# Patient Record
Sex: FEMALE | Race: WHITE | Employment: OTHER | URBAN - METROPOLITAN AREA
[De-identification: names, ages, dates, MRNs, and addresses within clinical notes are randomized per-mention and may not be internally consistent; named-entity substitution may affect disease eponyms.]

---

## 2017-05-31 ENCOUNTER — HOSPITAL ENCOUNTER (INPATIENT)
Age: 77
LOS: 5 days | Discharge: NURSING, REHAB OR LONG-TERM CARE | DRG: 470 | End: 2017-06-05
Attending: EMERGENCY MEDICINE | Admitting: FAMILY MEDICINE
Payer: MEDICARE

## 2017-05-31 ENCOUNTER — ANESTHESIA EVENT (OUTPATIENT)
Dept: SURGERY | Age: 77
DRG: 470 | End: 2017-05-31
Payer: MEDICARE

## 2017-05-31 ENCOUNTER — APPOINTMENT (OUTPATIENT)
Dept: GENERAL RADIOLOGY | Age: 77
DRG: 470 | End: 2017-05-31
Attending: EMERGENCY MEDICINE
Payer: MEDICARE

## 2017-05-31 ENCOUNTER — ANESTHESIA (OUTPATIENT)
Dept: SURGERY | Age: 77
DRG: 470 | End: 2017-05-31
Payer: MEDICARE

## 2017-05-31 ENCOUNTER — APPOINTMENT (OUTPATIENT)
Dept: GENERAL RADIOLOGY | Age: 77
DRG: 470 | End: 2017-05-31
Attending: ORTHOPAEDIC SURGERY
Payer: MEDICARE

## 2017-05-31 DIAGNOSIS — S72.001A HIP FRACTURE, RIGHT, CLOSED, INITIAL ENCOUNTER (HCC): Primary | ICD-10-CM

## 2017-05-31 PROBLEM — S72.009A HIP FRACTURE (HCC): Status: ACTIVE | Noted: 2017-05-31

## 2017-05-31 LAB
ABO + RH BLD: NORMAL
ALBUMIN SERPL BCP-MCNC: 3.6 G/DL (ref 3.5–5)
ALBUMIN/GLOB SERPL: 0.9 {RATIO} (ref 1.1–2.2)
ALP SERPL-CCNC: 125 U/L (ref 45–117)
ALT SERPL-CCNC: 17 U/L (ref 12–78)
ANION GAP BLD CALC-SCNC: 8 MMOL/L (ref 5–15)
AST SERPL W P-5'-P-CCNC: 13 U/L (ref 15–37)
ATRIAL RATE: 62 BPM
BASOPHILS # BLD AUTO: 0.1 K/UL (ref 0–0.1)
BASOPHILS # BLD: 2 % (ref 0–1)
BILIRUB SERPL-MCNC: 0.5 MG/DL (ref 0.2–1)
BLOOD GROUP ANTIBODIES SERPL: NORMAL
BUN SERPL-MCNC: 15 MG/DL (ref 6–20)
BUN/CREAT SERPL: 19 (ref 12–20)
CALCIUM SERPL-MCNC: 9.3 MG/DL (ref 8.5–10.1)
CALCULATED P AXIS, ECG09: 72 DEGREES
CALCULATED R AXIS, ECG10: -6 DEGREES
CALCULATED T AXIS, ECG11: 37 DEGREES
CHLORIDE SERPL-SCNC: 105 MMOL/L (ref 97–108)
CO2 SERPL-SCNC: 29 MMOL/L (ref 21–32)
CREAT SERPL-MCNC: 0.81 MG/DL (ref 0.55–1.02)
DIAGNOSIS, 93000: NORMAL
EOSINOPHIL # BLD: 0 K/UL (ref 0–0.4)
EOSINOPHIL NFR BLD: 1 % (ref 0–7)
ERYTHROCYTE [DISTWIDTH] IN BLOOD BY AUTOMATED COUNT: 15.1 % (ref 11.5–14.5)
GLOBULIN SER CALC-MCNC: 4 G/DL (ref 2–4)
GLUCOSE SERPL-MCNC: 88 MG/DL (ref 65–100)
HCT VFR BLD AUTO: 36.3 % (ref 35–47)
HGB BLD-MCNC: 12 G/DL (ref 11.5–16)
INR PPP: 1.1 (ref 0.9–1.1)
LYMPHOCYTES # BLD AUTO: 31 % (ref 12–49)
LYMPHOCYTES # BLD: 1.2 K/UL (ref 0.8–3.5)
MAGNESIUM SERPL-MCNC: 2.3 MG/DL (ref 1.6–2.4)
MCH RBC QN AUTO: 29.3 PG (ref 26–34)
MCHC RBC AUTO-ENTMCNC: 33.1 G/DL (ref 30–36.5)
MCV RBC AUTO: 88.5 FL (ref 80–99)
MONOCYTES # BLD: 0.4 K/UL (ref 0–1)
MONOCYTES NFR BLD AUTO: 10 % (ref 5–13)
NEUTS SEG # BLD: 2.3 K/UL (ref 1.8–8)
NEUTS SEG NFR BLD AUTO: 56 % (ref 32–75)
P-R INTERVAL, ECG05: 166 MS
PLATELET # BLD AUTO: 242 K/UL (ref 150–400)
POTASSIUM SERPL-SCNC: 3.5 MMOL/L (ref 3.5–5.1)
PROT SERPL-MCNC: 7.6 G/DL (ref 6.4–8.2)
PROTHROMBIN TIME: 11.5 SEC (ref 9–11.1)
Q-T INTERVAL, ECG07: 472 MS
QRS DURATION, ECG06: 92 MS
QTC CALCULATION (BEZET), ECG08: 479 MS
RBC # BLD AUTO: 4.1 M/UL (ref 3.8–5.2)
SODIUM SERPL-SCNC: 142 MMOL/L (ref 136–145)
SPECIMEN EXP DATE BLD: NORMAL
VENTRICULAR RATE, ECG03: 62 BPM
WBC # BLD AUTO: 4 K/UL (ref 3.6–11)

## 2017-05-31 PROCEDURE — 74011000250 HC RX REV CODE- 250: Performed by: PHYSICIAN ASSISTANT

## 2017-05-31 PROCEDURE — 74011250636 HC RX REV CODE- 250/636

## 2017-05-31 PROCEDURE — 74011250636 HC RX REV CODE- 250/636: Performed by: PHYSICIAN ASSISTANT

## 2017-05-31 PROCEDURE — 77030032490 HC SLV COMPR SCD KNE COVD -B: Performed by: ORTHOPAEDIC SURGERY

## 2017-05-31 PROCEDURE — 99284 EMERGENCY DEPT VISIT MOD MDM: CPT

## 2017-05-31 PROCEDURE — C1776 JOINT DEVICE (IMPLANTABLE): HCPCS | Performed by: ORTHOPAEDIC SURGERY

## 2017-05-31 PROCEDURE — 77030018836 HC SOL IRR NACL ICUM -A: Performed by: ORTHOPAEDIC SURGERY

## 2017-05-31 PROCEDURE — 74011250637 HC RX REV CODE- 250/637: Performed by: PHYSICIAN ASSISTANT

## 2017-05-31 PROCEDURE — 77030020788: Performed by: ORTHOPAEDIC SURGERY

## 2017-05-31 PROCEDURE — 74011000258 HC RX REV CODE- 258: Performed by: PHYSICIAN ASSISTANT

## 2017-05-31 PROCEDURE — 77030012935 HC DRSG AQUACEL BMS -B: Performed by: ORTHOPAEDIC SURGERY

## 2017-05-31 PROCEDURE — 76000 FLUOROSCOPY <1 HR PHYS/QHP: CPT

## 2017-05-31 PROCEDURE — 77030034850: Performed by: ORTHOPAEDIC SURGERY

## 2017-05-31 PROCEDURE — 77030006784 HC BLD SAW OSC MCRA -B: Performed by: ORTHOPAEDIC SURGERY

## 2017-05-31 PROCEDURE — 77030008684 HC TU ET CUF COVD -B: Performed by: ANESTHESIOLOGY

## 2017-05-31 PROCEDURE — 36415 COLL VENOUS BLD VENIPUNCTURE: CPT | Performed by: EMERGENCY MEDICINE

## 2017-05-31 PROCEDURE — 77030002933 HC SUT MCRYL J&J -A: Performed by: ORTHOPAEDIC SURGERY

## 2017-05-31 PROCEDURE — 77030031139 HC SUT VCRL2 J&J -A: Performed by: ORTHOPAEDIC SURGERY

## 2017-05-31 PROCEDURE — 76060000034 HC ANESTHESIA 1.5 TO 2 HR: Performed by: ORTHOPAEDIC SURGERY

## 2017-05-31 PROCEDURE — 74011000250 HC RX REV CODE- 250: Performed by: ORTHOPAEDIC SURGERY

## 2017-05-31 PROCEDURE — 77030011640 HC PAD GRND REM COVD -A: Performed by: ORTHOPAEDIC SURGERY

## 2017-05-31 PROCEDURE — 0SRR01Z REPLACEMENT OF RIGHT HIP JOINT, FEMORAL SURFACE WITH METAL SYNTHETIC SUBSTITUTE, OPEN APPROACH: ICD-10-PCS | Performed by: ORTHOPAEDIC SURGERY

## 2017-05-31 PROCEDURE — 74011000250 HC RX REV CODE- 250

## 2017-05-31 PROCEDURE — 77030018846 HC SOL IRR STRL H20 ICUM -A: Performed by: ORTHOPAEDIC SURGERY

## 2017-05-31 PROCEDURE — 77030026438 HC STYL ET INTUB CARD -A: Performed by: ANESTHESIOLOGY

## 2017-05-31 PROCEDURE — 65660000000 HC RM CCU STEPDOWN

## 2017-05-31 PROCEDURE — 74011250636 HC RX REV CODE- 250/636: Performed by: ANESTHESIOLOGY

## 2017-05-31 PROCEDURE — 77030010507 HC ADH SKN DERMBND J&J -B: Performed by: ORTHOPAEDIC SURGERY

## 2017-05-31 PROCEDURE — 76210000016 HC OR PH I REC 1 TO 1.5 HR: Performed by: ORTHOPAEDIC SURGERY

## 2017-05-31 PROCEDURE — 85025 COMPLETE CBC W/AUTO DIFF WBC: CPT | Performed by: EMERGENCY MEDICINE

## 2017-05-31 PROCEDURE — 93005 ELECTROCARDIOGRAM TRACING: CPT

## 2017-05-31 PROCEDURE — 80053 COMPREHEN METABOLIC PANEL: CPT | Performed by: EMERGENCY MEDICINE

## 2017-05-31 PROCEDURE — 73501 X-RAY EXAM HIP UNI 1 VIEW: CPT

## 2017-05-31 PROCEDURE — 86900 BLOOD TYPING SEROLOGIC ABO: CPT | Performed by: FAMILY MEDICINE

## 2017-05-31 PROCEDURE — 76010000153 HC OR TIME 1.5 TO 2 HR: Performed by: ORTHOPAEDIC SURGERY

## 2017-05-31 PROCEDURE — 83735 ASSAY OF MAGNESIUM: CPT | Performed by: FAMILY MEDICINE

## 2017-05-31 PROCEDURE — 77030035236 HC SUT PDS STRATFX BARB J&J -B: Performed by: ORTHOPAEDIC SURGERY

## 2017-05-31 PROCEDURE — 51798 US URINE CAPACITY MEASURE: CPT

## 2017-05-31 PROCEDURE — 77030013079 HC BLNKT BAIR HGGR 3M -A: Performed by: ANESTHESIOLOGY

## 2017-05-31 PROCEDURE — 71010 XR CHEST PORT: CPT

## 2017-05-31 PROCEDURE — 85610 PROTHROMBIN TIME: CPT | Performed by: EMERGENCY MEDICINE

## 2017-05-31 DEVICE — STEM FEM CEM HIP UPLR PART POROUS HA SUMMIT: Type: IMPLANTABLE DEVICE | Status: FUNCTIONAL

## 2017-05-31 DEVICE — HEAD BPLR OD50MM ID28MM FEM HIP SELF CNTR: Type: IMPLANTABLE DEVICE | Site: HIP | Status: FUNCTIONAL

## 2017-05-31 DEVICE — HEAD FEM DIA28MM NK L+1.5MM HIP MTL ON MTL 12/14 TAPR: Type: IMPLANTABLE DEVICE | Site: HIP | Status: FUNCTIONAL

## 2017-05-31 DEVICE — STEM FEM SZ 6 L150MM 130DEG STD OFFSET CALCAR HIP BILAT TI: Type: IMPLANTABLE DEVICE | Site: HIP | Status: FUNCTIONAL

## 2017-05-31 DEVICE — STEM FEM CEM HIP UPLR HD PART POROUS HA SUMMIT: Type: IMPLANTABLE DEVICE | Status: FUNCTIONAL

## 2017-05-31 RX ORDER — ONDANSETRON 2 MG/ML
4 INJECTION INTRAMUSCULAR; INTRAVENOUS AS NEEDED
Status: DISCONTINUED | OUTPATIENT
Start: 2017-05-31 | End: 2017-05-31 | Stop reason: HOSPADM

## 2017-05-31 RX ORDER — CEFAZOLIN SODIUM IN 0.9 % NACL 2 G/50 ML
2 INTRAVENOUS SOLUTION, PIGGYBACK (ML) INTRAVENOUS ONCE
Status: DISCONTINUED | OUTPATIENT
Start: 2017-05-31 | End: 2017-05-31 | Stop reason: SDUPTHER

## 2017-05-31 RX ORDER — LIDOCAINE HYDROCHLORIDE 10 MG/ML
0.1 INJECTION, SOLUTION EPIDURAL; INFILTRATION; INTRACAUDAL; PERINEURAL AS NEEDED
Status: DISCONTINUED | OUTPATIENT
Start: 2017-05-31 | End: 2017-05-31 | Stop reason: HOSPADM

## 2017-05-31 RX ORDER — MIDAZOLAM HYDROCHLORIDE 1 MG/ML
0.5 INJECTION, SOLUTION INTRAMUSCULAR; INTRAVENOUS
Status: DISCONTINUED | OUTPATIENT
Start: 2017-05-31 | End: 2017-05-31 | Stop reason: HOSPADM

## 2017-05-31 RX ORDER — PROPOFOL 10 MG/ML
INJECTION, EMULSION INTRAVENOUS AS NEEDED
Status: DISCONTINUED | OUTPATIENT
Start: 2017-05-31 | End: 2017-05-31 | Stop reason: HOSPADM

## 2017-05-31 RX ORDER — ACETAMINOPHEN 500 MG
1000 TABLET ORAL ONCE
Status: COMPLETED | OUTPATIENT
Start: 2017-05-31 | End: 2017-05-31

## 2017-05-31 RX ORDER — PRIMIDONE 50 MG/1
200 TABLET ORAL
Status: DISCONTINUED | OUTPATIENT
Start: 2017-06-01 | End: 2017-06-05 | Stop reason: HOSPADM

## 2017-05-31 RX ORDER — OXYCODONE HYDROCHLORIDE 5 MG/1
2.5 TABLET ORAL
Status: DISCONTINUED | OUTPATIENT
Start: 2017-05-31 | End: 2017-06-05 | Stop reason: HOSPADM

## 2017-05-31 RX ORDER — SODIUM CHLORIDE 0.9 % (FLUSH) 0.9 %
5-10 SYRINGE (ML) INJECTION AS NEEDED
Status: DISCONTINUED | OUTPATIENT
Start: 2017-05-31 | End: 2017-05-31 | Stop reason: HOSPADM

## 2017-05-31 RX ORDER — NEOSTIGMINE METHYLSULFATE 1 MG/ML
INJECTION INTRAVENOUS AS NEEDED
Status: DISCONTINUED | OUTPATIENT
Start: 2017-05-31 | End: 2017-05-31 | Stop reason: HOSPADM

## 2017-05-31 RX ORDER — ESCITALOPRAM OXALATE 10 MG/1
20 TABLET ORAL DAILY
Status: DISCONTINUED | OUTPATIENT
Start: 2017-06-01 | End: 2017-06-05 | Stop reason: HOSPADM

## 2017-05-31 RX ORDER — FACIAL-BODY WIPES
10 EACH TOPICAL DAILY PRN
Status: DISCONTINUED | OUTPATIENT
Start: 2017-06-02 | End: 2017-06-05 | Stop reason: HOSPADM

## 2017-05-31 RX ORDER — TRAMADOL HYDROCHLORIDE 50 MG/1
50 TABLET ORAL
Status: DISCONTINUED | OUTPATIENT
Start: 2017-05-31 | End: 2017-06-05 | Stop reason: HOSPADM

## 2017-05-31 RX ORDER — PREGABALIN 75 MG/1
75 CAPSULE ORAL ONCE
Status: COMPLETED | OUTPATIENT
Start: 2017-05-31 | End: 2017-05-31

## 2017-05-31 RX ORDER — HYDROCODONE BITARTRATE AND ACETAMINOPHEN 5; 325 MG/1; MG/1
1 TABLET ORAL
COMMUNITY

## 2017-05-31 RX ORDER — LIDOCAINE HYDROCHLORIDE 20 MG/ML
INJECTION, SOLUTION EPIDURAL; INFILTRATION; INTRACAUDAL; PERINEURAL AS NEEDED
Status: DISCONTINUED | OUTPATIENT
Start: 2017-05-31 | End: 2017-05-31 | Stop reason: HOSPADM

## 2017-05-31 RX ORDER — ROCURONIUM BROMIDE 10 MG/ML
INJECTION, SOLUTION INTRAVENOUS AS NEEDED
Status: DISCONTINUED | OUTPATIENT
Start: 2017-05-31 | End: 2017-05-31 | Stop reason: HOSPADM

## 2017-05-31 RX ORDER — NALOXONE HYDROCHLORIDE 0.4 MG/ML
0.4 INJECTION, SOLUTION INTRAMUSCULAR; INTRAVENOUS; SUBCUTANEOUS AS NEEDED
Status: DISCONTINUED | OUTPATIENT
Start: 2017-05-31 | End: 2017-06-05 | Stop reason: HOSPADM

## 2017-05-31 RX ORDER — MIDAZOLAM HYDROCHLORIDE 1 MG/ML
1 INJECTION, SOLUTION INTRAMUSCULAR; INTRAVENOUS AS NEEDED
Status: DISCONTINUED | OUTPATIENT
Start: 2017-05-31 | End: 2017-05-31 | Stop reason: HOSPADM

## 2017-05-31 RX ORDER — AMOXICILLIN 250 MG
1 CAPSULE ORAL 2 TIMES DAILY
Status: DISCONTINUED | OUTPATIENT
Start: 2017-06-01 | End: 2017-06-05 | Stop reason: HOSPADM

## 2017-05-31 RX ORDER — GLYCOPYRROLATE 0.2 MG/ML
INJECTION INTRAMUSCULAR; INTRAVENOUS AS NEEDED
Status: DISCONTINUED | OUTPATIENT
Start: 2017-05-31 | End: 2017-05-31 | Stop reason: HOSPADM

## 2017-05-31 RX ORDER — MIDODRINE HYDROCHLORIDE 10 MG/1
10 TABLET ORAL 3 TIMES DAILY
COMMUNITY

## 2017-05-31 RX ORDER — OXYCODONE AND ACETAMINOPHEN 5; 325 MG/1; MG/1
1 TABLET ORAL AS NEEDED
Status: DISCONTINUED | OUTPATIENT
Start: 2017-05-31 | End: 2017-05-31 | Stop reason: HOSPADM

## 2017-05-31 RX ORDER — LORATADINE 10 MG/1
10 TABLET ORAL
COMMUNITY

## 2017-05-31 RX ORDER — CLONAZEPAM 1 MG/1
1 TABLET ORAL 2 TIMES DAILY
COMMUNITY

## 2017-05-31 RX ORDER — HYDROMORPHONE HYDROCHLORIDE 1 MG/ML
0.2 INJECTION, SOLUTION INTRAMUSCULAR; INTRAVENOUS; SUBCUTANEOUS
Status: DISCONTINUED | OUTPATIENT
Start: 2017-05-31 | End: 2017-05-31 | Stop reason: HOSPADM

## 2017-05-31 RX ORDER — BUTALBITAL, ACETAMINOPHEN, CAFFEINE AND CODEINE PHOSPHATE 50; 325; 40; 30 MG/1; MG/1; MG/1; MG/1
1 CAPSULE ORAL
COMMUNITY

## 2017-05-31 RX ORDER — ACETAMINOPHEN 325 MG/1
650 TABLET ORAL EVERY 6 HOURS
Status: DISCONTINUED | OUTPATIENT
Start: 2017-06-01 | End: 2017-06-05 | Stop reason: HOSPADM

## 2017-05-31 RX ORDER — CEFAZOLIN SODIUM IN 0.9 % NACL 2 G/50 ML
2 INTRAVENOUS SOLUTION, PIGGYBACK (ML) INTRAVENOUS EVERY 8 HOURS
Status: COMPLETED | OUTPATIENT
Start: 2017-06-01 | End: 2017-06-01

## 2017-05-31 RX ORDER — OXYCODONE HYDROCHLORIDE 5 MG/1
5 TABLET ORAL
Status: DISCONTINUED | OUTPATIENT
Start: 2017-05-31 | End: 2017-06-05 | Stop reason: HOSPADM

## 2017-05-31 RX ORDER — MIDODRINE HYDROCHLORIDE 5 MG/1
10 TABLET ORAL
Status: DISCONTINUED | OUTPATIENT
Start: 2017-06-01 | End: 2017-06-05 | Stop reason: HOSPADM

## 2017-05-31 RX ORDER — ONDANSETRON 2 MG/ML
4 INJECTION INTRAMUSCULAR; INTRAVENOUS
Status: ACTIVE | OUTPATIENT
Start: 2017-05-31 | End: 2017-06-01

## 2017-05-31 RX ORDER — MELOXICAM 7.5 MG/1
15 TABLET ORAL DAILY
Status: DISCONTINUED | OUTPATIENT
Start: 2017-06-01 | End: 2017-06-05 | Stop reason: HOSPADM

## 2017-05-31 RX ORDER — ONDANSETRON 2 MG/ML
INJECTION INTRAMUSCULAR; INTRAVENOUS AS NEEDED
Status: DISCONTINUED | OUTPATIENT
Start: 2017-05-31 | End: 2017-05-31 | Stop reason: HOSPADM

## 2017-05-31 RX ORDER — HYDROXYZINE HYDROCHLORIDE 10 MG/1
10 TABLET, FILM COATED ORAL
Status: DISCONTINUED | OUTPATIENT
Start: 2017-05-31 | End: 2017-06-05 | Stop reason: HOSPADM

## 2017-05-31 RX ORDER — SODIUM CHLORIDE 0.9 % (FLUSH) 0.9 %
5-10 SYRINGE (ML) INJECTION EVERY 8 HOURS
Status: DISCONTINUED | OUTPATIENT
Start: 2017-06-01 | End: 2017-06-05 | Stop reason: HOSPADM

## 2017-05-31 RX ORDER — SODIUM CHLORIDE, SODIUM LACTATE, POTASSIUM CHLORIDE, CALCIUM CHLORIDE 600; 310; 30; 20 MG/100ML; MG/100ML; MG/100ML; MG/100ML
125 INJECTION, SOLUTION INTRAVENOUS CONTINUOUS
Status: DISCONTINUED | OUTPATIENT
Start: 2017-05-31 | End: 2017-05-31 | Stop reason: HOSPADM

## 2017-05-31 RX ORDER — THERA TABS 400 MCG
1 TAB ORAL DAILY
Status: DISCONTINUED | OUTPATIENT
Start: 2017-06-01 | End: 2017-06-05 | Stop reason: HOSPADM

## 2017-05-31 RX ORDER — SODIUM CHLORIDE 0.9 % (FLUSH) 0.9 %
5-10 SYRINGE (ML) INJECTION AS NEEDED
Status: DISCONTINUED | OUTPATIENT
Start: 2017-05-31 | End: 2017-06-05 | Stop reason: HOSPADM

## 2017-05-31 RX ORDER — POLYETHYLENE GLYCOL 3350 17 G/17G
17 POWDER, FOR SOLUTION ORAL DAILY
Status: DISCONTINUED | OUTPATIENT
Start: 2017-06-01 | End: 2017-06-05 | Stop reason: HOSPADM

## 2017-05-31 RX ORDER — SODIUM CHLORIDE 9 MG/ML
75 INJECTION, SOLUTION INTRAVENOUS CONTINUOUS
Status: DISCONTINUED | OUTPATIENT
Start: 2017-06-01 | End: 2017-06-01

## 2017-05-31 RX ORDER — ENOXAPARIN SODIUM 100 MG/ML
40 INJECTION SUBCUTANEOUS DAILY
Status: DISCONTINUED | OUTPATIENT
Start: 2017-06-01 | End: 2017-06-05 | Stop reason: HOSPADM

## 2017-05-31 RX ORDER — FENTANYL CITRATE 50 UG/ML
INJECTION, SOLUTION INTRAMUSCULAR; INTRAVENOUS AS NEEDED
Status: DISCONTINUED | OUTPATIENT
Start: 2017-05-31 | End: 2017-05-31 | Stop reason: HOSPADM

## 2017-05-31 RX ORDER — DEXAMETHASONE SODIUM PHOSPHATE 4 MG/ML
4 INJECTION, SOLUTION INTRA-ARTICULAR; INTRALESIONAL; INTRAMUSCULAR; INTRAVENOUS; SOFT TISSUE ONCE
Status: DISCONTINUED | OUTPATIENT
Start: 2017-05-31 | End: 2017-05-31 | Stop reason: HOSPADM

## 2017-05-31 RX ORDER — FLUDROCORTISONE ACETATE 0.1 MG/1
0.1 TABLET ORAL DAILY
Status: DISCONTINUED | OUTPATIENT
Start: 2017-06-01 | End: 2017-06-05 | Stop reason: HOSPADM

## 2017-05-31 RX ORDER — FLUDROCORTISONE ACETATE 0.1 MG/1
0.1 TABLET ORAL DAILY
COMMUNITY

## 2017-05-31 RX ORDER — FERROUS SULFATE, DRIED 160(50) MG
1 TABLET, EXTENDED RELEASE ORAL
Status: DISCONTINUED | OUTPATIENT
Start: 2017-06-01 | End: 2017-06-05 | Stop reason: HOSPADM

## 2017-05-31 RX ORDER — CELECOXIB 200 MG/1
200 CAPSULE ORAL ONCE
Status: COMPLETED | OUTPATIENT
Start: 2017-05-31 | End: 2017-05-31

## 2017-05-31 RX ORDER — DIPHENHYDRAMINE HYDROCHLORIDE 50 MG/ML
12.5 INJECTION, SOLUTION INTRAMUSCULAR; INTRAVENOUS AS NEEDED
Status: DISCONTINUED | OUTPATIENT
Start: 2017-05-31 | End: 2017-05-31 | Stop reason: HOSPADM

## 2017-05-31 RX ORDER — PRIMIDONE 50 MG/1
200 TABLET ORAL
COMMUNITY

## 2017-05-31 RX ORDER — CEFAZOLIN SODIUM IN 0.9 % NACL 2 G/50 ML
2 INTRAVENOUS SOLUTION, PIGGYBACK (ML) INTRAVENOUS ONCE
Status: COMPLETED | OUTPATIENT
Start: 2017-05-31 | End: 2017-05-31

## 2017-05-31 RX ORDER — CLONAZEPAM 1 MG/1
1 TABLET ORAL
Status: DISCONTINUED | OUTPATIENT
Start: 2017-05-31 | End: 2017-06-05 | Stop reason: HOSPADM

## 2017-05-31 RX ORDER — BUPIVACAINE HYDROCHLORIDE AND EPINEPHRINE 5; 5 MG/ML; UG/ML
30 INJECTION, SOLUTION EPIDURAL; INTRACAUDAL; PERINEURAL ONCE
Status: COMPLETED | OUTPATIENT
Start: 2017-05-31 | End: 2017-05-31

## 2017-05-31 RX ORDER — FENTANYL CITRATE 50 UG/ML
50 INJECTION, SOLUTION INTRAMUSCULAR; INTRAVENOUS AS NEEDED
Status: DISCONTINUED | OUTPATIENT
Start: 2017-05-31 | End: 2017-05-31 | Stop reason: HOSPADM

## 2017-05-31 RX ORDER — MORPHINE SULFATE 10 MG/ML
2 INJECTION, SOLUTION INTRAMUSCULAR; INTRAVENOUS
Status: DISCONTINUED | OUTPATIENT
Start: 2017-05-31 | End: 2017-05-31 | Stop reason: HOSPADM

## 2017-05-31 RX ORDER — ESCITALOPRAM OXALATE 20 MG/1
20 TABLET ORAL DAILY
COMMUNITY

## 2017-05-31 RX ORDER — SODIUM CHLORIDE 9 MG/ML
125 INJECTION, SOLUTION INTRAVENOUS CONTINUOUS
Status: DISCONTINUED | OUTPATIENT
Start: 2017-05-31 | End: 2017-06-01

## 2017-05-31 RX ORDER — SODIUM CHLORIDE 9 MG/ML
25 INJECTION, SOLUTION INTRAVENOUS CONTINUOUS
Status: DISCONTINUED | OUTPATIENT
Start: 2017-05-31 | End: 2017-05-31 | Stop reason: HOSPADM

## 2017-05-31 RX ORDER — FENTANYL CITRATE 50 UG/ML
25 INJECTION, SOLUTION INTRAMUSCULAR; INTRAVENOUS
Status: COMPLETED | OUTPATIENT
Start: 2017-05-31 | End: 2017-05-31

## 2017-05-31 RX ORDER — MORPHINE SULFATE 2 MG/ML
1 INJECTION, SOLUTION INTRAMUSCULAR; INTRAVENOUS
Status: ACTIVE | OUTPATIENT
Start: 2017-05-31 | End: 2017-06-01

## 2017-05-31 RX ORDER — HYDROMORPHONE HYDROCHLORIDE 1 MG/ML
INJECTION, SOLUTION INTRAMUSCULAR; INTRAVENOUS; SUBCUTANEOUS AS NEEDED
Status: DISCONTINUED | OUTPATIENT
Start: 2017-05-31 | End: 2017-05-31 | Stop reason: HOSPADM

## 2017-05-31 RX ORDER — ONDANSETRON 4 MG/1
4 TABLET, ORALLY DISINTEGRATING ORAL
Status: DISCONTINUED | OUTPATIENT
Start: 2017-05-31 | End: 2017-06-05 | Stop reason: HOSPADM

## 2017-05-31 RX ORDER — DEXAMETHASONE SODIUM PHOSPHATE 4 MG/ML
INJECTION, SOLUTION INTRA-ARTICULAR; INTRALESIONAL; INTRAMUSCULAR; INTRAVENOUS; SOFT TISSUE AS NEEDED
Status: DISCONTINUED | OUTPATIENT
Start: 2017-05-31 | End: 2017-05-31 | Stop reason: HOSPADM

## 2017-05-31 RX ORDER — HYDROCODONE BITARTRATE AND ACETAMINOPHEN 5; 325 MG/1; MG/1
1 TABLET ORAL
Status: CANCELLED | OUTPATIENT
Start: 2017-05-31

## 2017-05-31 RX ORDER — SODIUM CHLORIDE 0.9 % (FLUSH) 0.9 %
5-10 SYRINGE (ML) INJECTION EVERY 8 HOURS
Status: DISCONTINUED | OUTPATIENT
Start: 2017-05-31 | End: 2017-05-31 | Stop reason: HOSPADM

## 2017-05-31 RX ADMIN — FENTANYL CITRATE 25 MCG: 50 INJECTION, SOLUTION INTRAMUSCULAR; INTRAVENOUS at 20:09

## 2017-05-31 RX ADMIN — LIDOCAINE HYDROCHLORIDE 60 MG: 20 INJECTION, SOLUTION EPIDURAL; INFILTRATION; INTRACAUDAL; PERINEURAL at 19:24

## 2017-05-31 RX ADMIN — HYDROMORPHONE HYDROCHLORIDE 0.25 MG: 1 INJECTION, SOLUTION INTRAMUSCULAR; INTRAVENOUS; SUBCUTANEOUS at 21:22

## 2017-05-31 RX ADMIN — FENTANYL CITRATE 25 MCG: 50 INJECTION, SOLUTION INTRAMUSCULAR; INTRAVENOUS at 21:25

## 2017-05-31 RX ADMIN — DEXAMETHASONE SODIUM PHOSPHATE 8 MG: 4 INJECTION, SOLUTION INTRA-ARTICULAR; INTRALESIONAL; INTRAMUSCULAR; INTRAVENOUS; SOFT TISSUE at 19:55

## 2017-05-31 RX ADMIN — FENTANYL CITRATE 25 MCG: 50 INJECTION, SOLUTION INTRAMUSCULAR; INTRAVENOUS at 21:35

## 2017-05-31 RX ADMIN — ROCURONIUM BROMIDE 10 MG: 10 INJECTION, SOLUTION INTRAVENOUS at 19:52

## 2017-05-31 RX ADMIN — FENTANYL CITRATE 25 MCG: 50 INJECTION, SOLUTION INTRAMUSCULAR; INTRAVENOUS at 21:30

## 2017-05-31 RX ADMIN — PROPOFOL 110 MG: 10 INJECTION, EMULSION INTRAVENOUS at 19:24

## 2017-05-31 RX ADMIN — ROCURONIUM BROMIDE 10 MG: 10 INJECTION, SOLUTION INTRAVENOUS at 20:10

## 2017-05-31 RX ADMIN — TRANEXAMIC ACID 1 G: 100 INJECTION, SOLUTION INTRAVENOUS at 19:55

## 2017-05-31 RX ADMIN — ONDANSETRON 4 MG: 2 INJECTION INTRAMUSCULAR; INTRAVENOUS at 19:55

## 2017-05-31 RX ADMIN — CEFAZOLIN 2 G: 1 INJECTION, POWDER, FOR SOLUTION INTRAMUSCULAR; INTRAVENOUS; PARENTERAL at 19:54

## 2017-05-31 RX ADMIN — FENTANYL CITRATE 25 MCG: 50 INJECTION, SOLUTION INTRAMUSCULAR; INTRAVENOUS at 21:40

## 2017-05-31 RX ADMIN — ROCURONIUM BROMIDE 10 MG: 10 INJECTION, SOLUTION INTRAVENOUS at 19:30

## 2017-05-31 RX ADMIN — ROCURONIUM BROMIDE 10 MG: 10 INJECTION, SOLUTION INTRAVENOUS at 20:31

## 2017-05-31 RX ADMIN — ROCURONIUM BROMIDE 15 MG: 10 INJECTION, SOLUTION INTRAVENOUS at 19:39

## 2017-05-31 RX ADMIN — ACETAMINOPHEN 1000 MG: 500 TABLET, FILM COATED ORAL at 19:00

## 2017-05-31 RX ADMIN — GLYCOPYRROLATE 0.5 MG: 0.2 INJECTION INTRAMUSCULAR; INTRAVENOUS at 20:59

## 2017-05-31 RX ADMIN — FENTANYL CITRATE 25 MCG: 50 INJECTION, SOLUTION INTRAMUSCULAR; INTRAVENOUS at 19:24

## 2017-05-31 RX ADMIN — FENTANYL CITRATE 50 MCG: 50 INJECTION, SOLUTION INTRAMUSCULAR; INTRAVENOUS at 20:29

## 2017-05-31 RX ADMIN — PREGABALIN 75 MG: 75 CAPSULE ORAL at 19:00

## 2017-05-31 RX ADMIN — ROCURONIUM BROMIDE 5 MG: 10 INJECTION, SOLUTION INTRAVENOUS at 19:24

## 2017-05-31 RX ADMIN — SODIUM CHLORIDE 125 ML/HR: 900 INJECTION, SOLUTION INTRAVENOUS at 22:23

## 2017-05-31 RX ADMIN — NEOSTIGMINE METHYLSULFATE 3 MG: 1 INJECTION INTRAVENOUS at 20:59

## 2017-05-31 RX ADMIN — SODIUM CHLORIDE, SODIUM LACTATE, POTASSIUM CHLORIDE, AND CALCIUM CHLORIDE 125 ML/HR: 600; 310; 30; 20 INJECTION, SOLUTION INTRAVENOUS at 18:40

## 2017-05-31 RX ADMIN — HYDROMORPHONE HYDROCHLORIDE 0.25 MG: 1 INJECTION, SOLUTION INTRAMUSCULAR; INTRAVENOUS; SUBCUTANEOUS at 20:09

## 2017-05-31 RX ADMIN — CELECOXIB 200 MG: 200 CAPSULE ORAL at 19:00

## 2017-05-31 RX ADMIN — SODIUM CHLORIDE, SODIUM LACTATE, POTASSIUM CHLORIDE, AND CALCIUM CHLORIDE: 600; 310; 30; 20 INJECTION, SOLUTION INTRAVENOUS at 21:05

## 2017-05-31 NOTE — PROGRESS NOTES
TRANSFER - IN REPORT:    Verbal report received from Maria Luz Lucas RN(name) on Avery Perry  being received from ER(unit) for routine progression of care      Report consisted of patients Situation, Background, Assessment and   Recommendations(SBAR). Information from the following report(s) SBAR, Kardex, Intake/Output and MAR was reviewed with the receiving nurse. Opportunity for questions and clarification was provided. Assessment completed upon patients arrival to unit and care assumed.

## 2017-05-31 NOTE — H&P
[address] [place] [facility] [address]   HISTORY AND PHYSICAL       Name:  Marissa Peter   MR#:  [id number]   :  1940   Account #:  [de-identified]        Date of Adm:  2017       ATTENDING PHYSICIAN: aSl Patel MD    CHIEF COMPLAINT: Right hip pain. HISTORY OF PRESENT ILLNESS: The patient is a 59-year-old   female with past medical history of atrial fibrillation, decubitus ulcer,   abdominal surgery secondary to SBO, history of anxiety, orthostatic   hypotension, who presents to the hospital secondary to the above   mentioned symptoms. The patient reports that she had abdominal   surgery in 10/2017. Post that, she never recovered well. Was   wheelchair bound for a few months as she did not have enough   strength in her legs. rehab and was able to walk at the assisted care   facility. About 3 weeks back, the patient got up from a sitting position   and felt that \"her right leg gave way,\" had a fall, did not have any other   injuries associated with the same. Regardless, the patient had an x-ray   done and some of the results were not conveyed to the physicians. The   patient continued to have pain and was not able to ambulate well on   her right leg. Today, she went to see the orthopedic surgeon and an x-  ray was done, which showed a right hip fracture. The patient was   transferred to the [facility] ER and was requested to be admitted   under the hospitalist service. The patient reports that she has been   taking Tylenol and Norco for her pain. The patient denies any injuries   to any other parts of her body when she fell.  Denies any headache,   blurry vision, sore throat, trouble swallowing, trouble with speech, any   chest pain, shortness of breath, cough, fever, chills, abdominal pain,   constipation, diarrhea, hematemesis, melena, hemoptysis, urinary   symptoms, focal or generalized neurological weakness, recent travel,   sick contacts, lightheadedness, dizziness, loss of consciousness, or   any other concerns or problems. PAST MEDICAL HISTORY: See above. HOME MEDICATIONS   Currently, the patient is on:   1. Clonazepam 1 mg b.i.d.   2. Lexapro 20 mg daily. 3. Florinef 0.1 mg daily. 4. Midodrine 10 mg t.i.d.   5. 200 mg nightly. 6. Fioricet every 4 hours as needed. 6. Claritin 10 mg daily as needed. 7. Norco 5/325 mg q.4h.   8. Multivitamin 1 tablet daily. SOCIAL HISTORY: Denies alcohol, tobacco, IV drug abuse. Lives at   home. REVIEW OF SYSTEMS   A 10-point. Review of systems was done, which was essentially   negative except for the symptoms mentioned above. ALLERGIES: NO KNOWN DRUG ALLERGIES. FAMILY HISTORY: Mother had history of bladder cancer and arthritis. Father had history of duodenal ulcers and dementia. PHYSICAL EXAMINATION   VITAL SIGNS: TEMPERATURE: 98, pulse 68, respiratory rate 16,   blood pressure 140/90, pulse oximetry 100% on room air. GENERAL:   Alert and oriented x3, awake, mildly distressed, pleasant female,   appears to be stated age. HEENT: Pupils equal and reactive to light. Dry mucous membranes. Tympanic membranes clear. NECK: Supple. CHEST: Clear to auscultation bilaterally. CORONARY: S1, S2 were heard. ABDOMEN: Soft, nontender, nondistended. Bowel sounds physiologic. EXTREMITIES: There is mild tenderness to palpation in the right hip in   the posterior aspect. No clubbing, no cyanosis, no edema. No   deformity of the extremity was noted. No ecchymosis is noted. Good   pulses. NEUROPSYCHIATRIC: Pleasant mood and affect. Sensory grossly   within normal limits. DTR 2+. Strength: Moves all 4 extremities. Cranial   nerves 2 through 12 grossly intact, Strength 5/5 in bilateral upper   extremity and left upper extremity. Right lower extremity could not be   tested secondary to pain. LABORATORY DATA: White count 4, hemoglobin 12, hematocrit 36.3,   platelets 193. INR 1.1.  Sodium 142, potassium 3.5, chloride 105,   bicarbonate 29, glucose 88, BUN 15, creatinine 0.81, calcium 9.3,   bilirubin total 0.5, albumin 3.6, ALT 17, AST 13. Alkaline phosphatase   125. INR 1.1. X-ray of the chest does not show any acute pathology. EKG shows atrial paced rhythm. X-ray of the hip per orthopedic provider shows a displaced right hip   femoral neck fracture. ASSESSMENT AND PLAN   1. Right hip fracture. The patient will be admitted on an ortho bed. We   will start the patient on pain control, IV hydration, n.p.o. after midnight   and neurovascular checks. We will await ortho input for possible   surgery in the morning. The patient will be an intermediate risk for   surgery based on her medical comorbidities. We will provide   supportive care and further intervention will be per hospital course. We   will keep the patient on complete bed rest. Reassess as needed and   continue to monitor. 2. History of atrial fibrillation, stable. Continue to monitor. The patient   currently not on amiodarone. We will monitor. Further intervention will   be per hospital course. 2. History of hypertension, monitor. 3. History of orthostatic hypotension. Continue home medications and   continue to monitor. The patient currently on bed rest. When is able to   walk, we will put the patient on fall risk. 4. History of anxiety. The patient on Lexapro and clonazepam.   Continue to monitor. Denies any suicidal or homicidal ideation. 5. Deep venous thrombosis prophylaxis. The patient is on sequential   compression devices.         Katie Pratt MD MM / Paul.Jillian   D:  05/31/2017   17:03   T:  05/31/2017   18:13   Job #:  [id number]

## 2017-05-31 NOTE — PROGRESS NOTES
TRANSFER - OUT REPORT:    Verbal report given to [facility]) on Mely Dave  being transferred to [facility](unit) for ordered procedure       Report consisted of patients Situation, Background, Assessment and   Recommendations(SBAR). Information from the following report(s) SBAR, Kardex, Intake/Output and MAR was reviewed with the receiving nurse. Lines:   Peripheral IV 05/31/17 Right Arm (Active)   Site Assessment Clean, dry, & intact 5/31/2017  5:40 PM   Phlebitis Assessment 0 5/31/2017  5:40 PM   Infiltration Assessment 0 5/31/2017  5:40 PM   Dressing Status Clean, dry, & intact 5/31/2017  5:40 PM   Dressing Type Transparent 5/31/2017  5:40 PM   Hub Color/Line Status Pink;Capped 5/31/2017  5:40 PM        Opportunity for questions and clarification was provided.       Patient transported with:

## 2017-05-31 NOTE — ROUTINE PROCESS
TRANSFER - OUT REPORT:    Verbal report given to [facility] rn(name) on Surinder Nguyen  being transferred to (unit) for routine progression of care       Report consisted of patients Situation, Background, Assessment and   Recommendations(SBAR). Information from the following report(s) SBAR, ED Summary, [facility] and Recent Results was reviewed with the receiving nurse. Lines:   Peripheral IV 05/31/17 Right Arm (Active)   Site Assessment Clean, dry, & intact 5/31/2017  3:43 PM   Phlebitis Assessment 0 5/31/2017  3:43 PM   Infiltration Assessment 0 5/31/2017  3:43 PM   Dressing Status Clean, dry, & intact 5/31/2017  3:43 PM   Dressing Type Transparent 5/31/2017  3:43 PM   Hub Color/Line Status Pink 5/31/2017  3:43 PM        Opportunity for questions and clarification was provided.       Patient transported with:   Ovelin

## 2017-05-31 NOTE — ED TRIAGE NOTES
Pt states that she tried getting up by herself three weeks ago and fell, pt did not go see a doctor, pt states that her right hip never stopped hurting and got an xray done by the mobile xray her assisted living facility uses two weeks ago. Pt was never told the results of the xray. Pt called her doctor last Friday and told them that her hip was still bothering her, pt was given an appointment to see her doctor today, pt went to her appointment and she was told by them to come to the ER. Pts doctor did three xrays and pt was informed that her hip was fractured and they want to do surgery. Pt is sitting in wheelchair in triage.

## 2017-05-31 NOTE — ROUTINE PROCESS
TRANSFER - IN REPORT:    Verbal report received from Radha Griffin, [facility] (name) on Mely Dave  being received from [address] [facility] (unit) for routine progression of care      Report consisted of patients Situation, Background, Assessment and   Recommendations(SBAR). Information from the following report(s) SBAR, Kardex, Intake/Output, MAR, Accordion, Recent Results and Med Rec Status was reviewed with the receiving nurse. Opportunity for questions and clarification was provided. Assessment completed upon patients arrival to unit and care assumed.

## 2017-05-31 NOTE — ED PROVIDER NOTES
HPI Comments: 67 yo female presents to ER for evaluation of right hip fracture. Pt sent from orthopedic doctor for evaluation and admission due to hip fracture. Pt fell 3 weeks ago onto hip. Pt had xray done at her home facility. Pt never received xray results. Pt sent for orthopedic visit today where xray shows hip fracture. Pt reports pain with walking or moving. Pt has been taking tylenol for pain and pain medicine from her doctor. Pt states comfortable while at rest.  No injury to head or neck. No back pain, no chest pain, shortness of breath, abdominal pain. Pain rated 3/10. It does not radiate. Social hx  Nonsmoker  Occasional alcohol    Patient is a 68 y.o. female presenting with hip pain. The history is provided by the patient. Hip Injury    Pertinent negatives include no numbness, no back pain and no neck pain. Past Medical History:   Diagnosis Date    Arthritis     Liver disease     Other ill-defined conditions     GI PROBLEMS    Other ill-defined conditions     SARCOIDOSIS    Other ill-defined conditions     ANXIETY       Past Surgical History:   Procedure Laterality Date    HX CATARACT REMOVAL      AME. W/LENS IMPLANT    HX DILATION AND CURETTAGE      X2    HX ORTHOPAEDIC      RT HAND MILLDLE FINGER GANGLION CYST REMOVAL    HX OTHER SURGICAL      EYELID SURGERY FOR DROOPY EYE LIDS    HX TONSILLECTOMY           Family History:   Problem Relation Age of Onset    Cancer Mother      BLADDER CA    Arthritis-osteo Mother     Other Father      DUODENAL ULCERS    Dementia Father      Eli Parlor    Thyroid Disease Sister     Heart Disease Brother     Other Brother      ABDOMINAL AORTIC ANEURYSM       Social History     Social History    Marital status:      Spouse name: N/A    Number of children: N/A    Years of education: N/A     Occupational History    Not on file.      Social History Main Topics    Smoking status: Never Smoker    Smokeless tobacco: Not on file    Alcohol use Yes      Comment: OCCAISIONALLY    Drug use: No    Sexual activity: Not on file     Other Topics Concern    Not on file     Social History Narrative         ALLERGIES: Review of patient's allergies indicates no known allergies. Review of Systems   Constitutional: Negative for chills and fatigue. HENT: Negative for trouble swallowing. Eyes: Negative for photophobia and visual disturbance. Respiratory: Negative for cough, chest tightness and shortness of breath. Cardiovascular: Negative for chest pain. Gastrointestinal: Negative for abdominal pain, nausea and vomiting. Musculoskeletal: Negative for back pain, myalgias, neck pain and neck stiffness. Right hip pain   Skin: Negative for color change and rash. Neurological: Negative for dizziness, weakness, light-headedness, numbness and headaches. All other systems reviewed and are negative. Vitals:    05/31/17 1356   BP: 176/83   Pulse: 63   Resp: 18   Temp: 97.4 °F (36.3 °C)   SpO2: 99%   Weight: 60.8 kg (134 lb)   Height: 5' 10\" (1.778 m)            Physical Exam   Constitutional: She is oriented to person, place, and time. She appears well-developed and well-nourished. No distress. HENT:   Head: Normocephalic and atraumatic. Eyes: Conjunctivae are normal. Pupils are equal, round, and reactive to light. Neck: Neck supple. Cardiovascular: Normal rate and regular rhythm. Pulmonary/Chest: Effort normal and breath sounds normal.   Abdominal: Soft. Bowel sounds are normal. She exhibits no distension and no mass. There is no tenderness. There is no rebound and no guarding. Abdomen exposed for exam.  Soft, no periotoneal signs   Musculoskeletal: Normal range of motion. Right hip: mild pain with palpation to posterior aspect. No leg shortening or rotation. No deformity. No warmth or swelling, no ecchymosis. 2+ dorsalis pedis. 5/5 flexion/extension of hip.    Neurological: She is alert and oriented to person, place, and time. She exhibits normal muscle tone. Coordination normal.   Skin: Skin is warm and dry. No rash noted. No erythema. Psychiatric: She has a normal mood and affect. Her behavior is normal. Judgment and thought content normal.   Nursing note and vitals reviewed. MDM  Number of Diagnoses or Management Options  Hip fracture, right, closed, initial encounter:   Diagnosis management comments: 69 yo female with hip fracture. Sent from ortho for evaluation. P: labs, cxr, ekg, ortho. 3:30 PM  Pt seen and evaluated by orthopedics. Awaiting hospitalist call    4:00 PM  Pt case including HPI, PE, and all available lab and radiology results has been discussed with hospitalist. He would like to be called back when labs back. 4:39 PM  Dr. Gurinder Prince made aware of labs being final. He will see for admission. Amount and/or Complexity of Data Reviewed  Discuss the patient with other providers: yes (ER attending-[place])    Patient Progress  Patient progress: stable    ED Course       Procedures         Pt has been seen and evaluated by attending physician who has reviewed lab work and imaging tests. He agrees with discharge and prescription plan.

## 2017-05-31 NOTE — PROGRESS NOTES
Admission Medication Reconciliation:    Information obtained from: Patient, RX query, Medication list    Significant PMH/Disease States:   Past Medical History:   Diagnosis Date    Arthritis     Liver disease     Other ill-defined conditions     GI PROBLEMS    Other ill-defined conditions     SARCOIDOSIS    Other ill-defined conditions     ANXIETY       Chief Complaint for this Admission:  Hip pain    Allergies:  Review of patient's allergies indicates no known allergies. Prior to Admission Medications:   Prior to Admission Medications   Prescriptions Last Dose Informant Patient Reported? Taking? HYDROcodone-acetaminophen (NORCO) 5-325 mg per tablet   Yes Yes   Sig: Take 1 Tab by mouth every four (4) hours as needed for Pain. clonazePAM (KLONOPIN) 1 mg tablet   Yes Yes   Sig: Take 1 mg by mouth two (2) times a day. codeine-butalbital-acetaminophen-caffeine (FIORICET WITH CODEINE) -17-30 mg per capsule   Yes Yes   Sig: Take 1 Cap by mouth every four (4) hours as needed for Headache.   escitalopram oxalate (LEXAPRO) 20 mg tablet   Yes Yes   Sig: Take 20 mg by mouth daily. fludrocortisone (FLORINEF) 0.1 mg tablet   Yes Yes   Sig: Take 0.1 mg by mouth daily. loratadine (CLARITIN) 10 mg tablet   Yes Yes   Sig: Take 10 mg by mouth daily as needed for Allergies. midodrine (PROAMITINE) 10 mg tablet   Yes Yes   Sig: Take 10 mg by mouth three (3) times daily. multivitamin (ONE A DAY) tablet  Self Yes Yes   Sig: Take 1 Tab by mouth daily. primidone (MYSOLINE) 50 mg tablet   Yes Yes   Sig: Take 200 mg by mouth nightly. Facility-Administered Medications: None         Comments/Recommendations: Patient reports she took her medication list to MD visit this morning. The PA documented medications in a progress note, which matched RX query. The patient could recall most of these medications and how many times a day she takes them.  She also reports that she takes some vitamins but doesn't know the names of them. 1. Removed Zovirax, aspirin, Tums, CoQ10, estradiol, Glucosamine-chondroitin, lorazepam, oxycodone, progesterone  2. Added clonazepam, Lexapro, fludrocortisone, midodrine, primidone, fioricet, claritin.

## 2017-05-31 NOTE — H&P
Current Meds   Acetaminophen TABS;; RPT  Primidone 50 MG Oral Tablet;; RPT  Hydrocodone-Acetaminophen 5-325 MG Oral Tablet;; RPT  Escitalopram Oxalate 20 MG Oral Tablet;; RPT  Midodrine HCl - 10 MG Oral Tablet;; RPT  ClonazePAM 1 MG Oral Tablet;; RPT  Fludrocortisone Acetate 0.1 MG Oral Tablet;; RPT  Butalbital-Aspirin-Caffeine TABS (Aspirin/Caffeine/Butalbital);; RPT  Claritin CAPS;; RPT  Sorbitol SOLN;; RPT. Allergies   No Known Drug Allergies. PMH   History of depression (Z86.59)  History of hypotension (Z86.79)  Tremor (R25.1). PSH   Shoulder Surgery Left. Personal Hx   Never a smoker. Vital Signs    Recorded by Tanna Milton on 31 May 2017 10:20 AM  Height: 5 ft 10 in, Weight: 134 lb , BMI: 19.2 kg/m2,   BMI Calculated: 19.23 ,   BSA Calculated: 1.76. Provider Note   This is a 79-year-old woman who comes in today to the office complaining of right hip pain. She attributes this to a fall which occurred approximately 3 weeks ago. She is currently residing at the [facility] for rehab. Apparently she had significant abdominal surgery for bowel obstruction with a prolonged recovery. She is there getting rehab as she really cover is from her abdominal surgery. She apparently had portable x-rays done after her fall however I do not have those results available today. She is had difficulty walking since her fall. She has continued to try to participate with physical therapy but unable to put full weight on the right hip. She denies any other injuries. She is otherwise reasonably healthy and active. Past medical history is significant for depression     On exam she is alert oriented x3. She has a BMI of 19.23. She has painful range of motion of the right hip. The right leg is shorter than the left by approximately 2 cm. She has good range of motion of the left hip with no pain on motion. She has good range of motion of bilateral knees without crepitus or effusion.   She has a strong pedal pulse. She has no pedal edema. Her skin is healthy and intact. She has no apparent atrophy. She has negative straight leg raise. I ordered an interpreted an AP pelvis and right lateral hip x-ray which show a displaced right hip femoral neck fracture. I reviewed her x-ray findings discussed treatment options. She will need a right hip hemiarthroplasty. I went over the procedure in detail with her including expected outcomes and postop recovery as well as risks and complications specifically DVT, PE, infection, dislocation, leg length discrepancy, nerve injury. After much discussion she is anxious to proceed. Will plan on getting her direct admitted to the hospitalist and plan on proceeding with right hip hemiarthroplasty this evening. Signature   Electronically signed by : Anisa Sahni MD ; 05/31/2017 1:19 PM EST; Author.

## 2017-05-31 NOTE — ANESTHESIA PREPROCEDURE EVALUATION
Anesthetic History   No history of anesthetic complications            Review of Systems / Medical History  Patient summary reviewed, nursing notes reviewed and pertinent labs reviewed    Pulmonary  Within defined limits                 Neuro/Psych   Within defined limits           Cardiovascular            Dysrhythmias : atrial fibrillation      Exercise tolerance: <4 METS     GI/Hepatic/Renal           Liver disease     Endo/Other        Arthritis     Other Findings   Comments: Sarcoid  Deep brain stimulator for tremors         Physical Exam    Airway  Mallampati: II  TM Distance: > 6 cm  Neck ROM: normal range of motion   Mouth opening: Normal     Cardiovascular  Regular rate and rhythm,  S1 and S2 normal,  no murmur, click, rub, or gallop             Dental  No notable dental hx       Pulmonary  Breath sounds clear to auscultation               Abdominal  GI exam deferred       Other Findings            Anesthetic Plan    ASA: 3  Anesthesia type: general          Induction: Intravenous  Anesthetic plan and risks discussed with: Patient and Son / Daughter      All questions answered.

## 2017-05-31 NOTE — PROGRESS NOTES
Primary Nurse Daniel Hilario RN and JIM Henry performed a dual skin assessment on this patient No impairment noted  Jhon score is

## 2017-06-01 LAB
ANION GAP BLD CALC-SCNC: 10 MMOL/L (ref 5–15)
APPEARANCE UR: ABNORMAL
BACTERIA SPEC CULT: NORMAL
BACTERIA SPEC CULT: NORMAL
BACTERIA URNS QL MICRO: NEGATIVE /HPF
BILIRUB UR QL: NEGATIVE
BUN SERPL-MCNC: 14 MG/DL (ref 6–20)
BUN/CREAT SERPL: 16 (ref 12–20)
CALCIUM SERPL-MCNC: 9 MG/DL (ref 8.5–10.1)
CHLORIDE SERPL-SCNC: 103 MMOL/L (ref 97–108)
CO2 SERPL-SCNC: 25 MMOL/L (ref 21–32)
COLOR UR: ABNORMAL
CREAT SERPL-MCNC: 0.85 MG/DL (ref 0.55–1.02)
EPITH CASTS URNS QL MICRO: ABNORMAL /LPF
GLUCOSE SERPL-MCNC: 139 MG/DL (ref 65–100)
GLUCOSE UR STRIP.AUTO-MCNC: NEGATIVE MG/DL
HGB BLD-MCNC: 11 G/DL (ref 11.5–16)
HGB UR QL STRIP: ABNORMAL
HYALINE CASTS URNS QL MICRO: ABNORMAL /LPF (ref 0–5)
KETONES UR QL STRIP.AUTO: NEGATIVE MG/DL
LEUKOCYTE ESTERASE UR QL STRIP.AUTO: ABNORMAL
NITRITE UR QL STRIP.AUTO: NEGATIVE
PH UR STRIP: 6 [PH] (ref 5–8)
POTASSIUM SERPL-SCNC: 3.7 MMOL/L (ref 3.5–5.1)
PROT UR STRIP-MCNC: NEGATIVE MG/DL
RBC #/AREA URNS HPF: ABNORMAL /HPF (ref 0–5)
SERVICE CMNT-IMP: NORMAL
SODIUM SERPL-SCNC: 138 MMOL/L (ref 136–145)
SP GR UR REFRACTOMETRY: 1.03 (ref 1–1.03)
UA: UC IF INDICATED,UAUC: ABNORMAL
UROBILINOGEN UR QL STRIP.AUTO: 0.2 EU/DL (ref 0.2–1)
WBC URNS QL MICRO: >100 /HPF (ref 0–4)

## 2017-06-01 PROCEDURE — G8988 SELF CARE GOAL STATUS: HCPCS

## 2017-06-01 PROCEDURE — G8987 SELF CARE CURRENT STATUS: HCPCS

## 2017-06-01 PROCEDURE — G8979 MOBILITY GOAL STATUS: HCPCS

## 2017-06-01 PROCEDURE — 87086 URINE CULTURE/COLONY COUNT: CPT | Performed by: ORTHOPAEDIC SURGERY

## 2017-06-01 PROCEDURE — 97161 PT EVAL LOW COMPLEX 20 MIN: CPT

## 2017-06-01 PROCEDURE — 81001 URINALYSIS AUTO W/SCOPE: CPT | Performed by: FAMILY MEDICINE

## 2017-06-01 PROCEDURE — G8978 MOBILITY CURRENT STATUS: HCPCS

## 2017-06-01 PROCEDURE — 97530 THERAPEUTIC ACTIVITIES: CPT

## 2017-06-01 PROCEDURE — 80048 BASIC METABOLIC PNL TOTAL CA: CPT | Performed by: PHYSICIAN ASSISTANT

## 2017-06-01 PROCEDURE — 74011250637 HC RX REV CODE- 250/637: Performed by: FAMILY MEDICINE

## 2017-06-01 PROCEDURE — 36415 COLL VENOUS BLD VENIPUNCTURE: CPT | Performed by: PHYSICIAN ASSISTANT

## 2017-06-01 PROCEDURE — 74011250637 HC RX REV CODE- 250/637: Performed by: PHYSICIAN ASSISTANT

## 2017-06-01 PROCEDURE — 85018 HEMOGLOBIN: CPT | Performed by: PHYSICIAN ASSISTANT

## 2017-06-01 PROCEDURE — 97165 OT EVAL LOW COMPLEX 30 MIN: CPT

## 2017-06-01 PROCEDURE — 65660000000 HC RM CCU STEPDOWN

## 2017-06-01 PROCEDURE — 74011250636 HC RX REV CODE- 250/636: Performed by: PHYSICIAN ASSISTANT

## 2017-06-01 PROCEDURE — 97535 SELF CARE MNGMENT TRAINING: CPT

## 2017-06-01 PROCEDURE — 97116 GAIT TRAINING THERAPY: CPT

## 2017-06-01 RX ADMIN — OXYCODONE HYDROCHLORIDE 5 MG: 5 TABLET ORAL at 09:36

## 2017-06-01 RX ADMIN — CEFAZOLIN 2 G: 1 INJECTION, POWDER, FOR SOLUTION INTRAMUSCULAR; INTRAVENOUS; PARENTERAL at 03:01

## 2017-06-01 RX ADMIN — ESCITALOPRAM OXALATE 20 MG: 10 TABLET ORAL at 09:28

## 2017-06-01 RX ADMIN — ACETAMINOPHEN 650 MG: 325 TABLET, FILM COATED ORAL at 06:22

## 2017-06-01 RX ADMIN — ACETAMINOPHEN 650 MG: 325 TABLET, FILM COATED ORAL at 00:07

## 2017-06-01 RX ADMIN — CLONAZEPAM 1 MG: 1 TABLET ORAL at 00:11

## 2017-06-01 RX ADMIN — ENOXAPARIN SODIUM 40 MG: 40 INJECTION SUBCUTANEOUS at 09:29

## 2017-06-01 RX ADMIN — MELOXICAM 15 MG: 7.5 TABLET ORAL at 09:28

## 2017-06-01 RX ADMIN — ACETAMINOPHEN 650 MG: 325 TABLET, FILM COATED ORAL at 18:02

## 2017-06-01 RX ADMIN — THERA TABS 1 TABLET: TAB at 09:29

## 2017-06-01 RX ADMIN — OXYCODONE HYDROCHLORIDE 5 MG: 5 TABLET ORAL at 21:46

## 2017-06-01 RX ADMIN — Medication 5 ML: at 22:00

## 2017-06-01 RX ADMIN — PRIMIDONE 200 MG: 50 TABLET ORAL at 21:46

## 2017-06-01 RX ADMIN — FLUDROCORTISONE ACETATE 100 MCG: 0.1 TABLET ORAL at 09:28

## 2017-06-01 RX ADMIN — CALCIUM CARBONATE 500 MG (1,250 MG)-VITAMIN D3 200 UNIT TABLET 1 TABLET: at 18:03

## 2017-06-01 RX ADMIN — CALCIUM CARBONATE 500 MG (1,250 MG)-VITAMIN D3 200 UNIT TABLET 1 TABLET: at 12:17

## 2017-06-01 RX ADMIN — MIDODRINE HYDROCHLORIDE 10 MG: 5 TABLET ORAL at 12:17

## 2017-06-01 RX ADMIN — POLYETHYLENE GLYCOL 3350 17 G: 17 POWDER, FOR SOLUTION ORAL at 09:30

## 2017-06-01 RX ADMIN — CALCIUM CARBONATE 500 MG (1,250 MG)-VITAMIN D3 200 UNIT TABLET 1 TABLET: at 09:36

## 2017-06-01 RX ADMIN — MIDODRINE HYDROCHLORIDE 10 MG: 5 TABLET ORAL at 18:03

## 2017-06-01 RX ADMIN — DOCUSATE SODIUM AND SENNOSIDES 1 TABLET: 8.6; 5 TABLET, FILM COATED ORAL at 09:00

## 2017-06-01 RX ADMIN — OXYCODONE HYDROCHLORIDE 5 MG: 5 TABLET ORAL at 13:51

## 2017-06-01 RX ADMIN — SODIUM CHLORIDE 125 ML/HR: 900 INJECTION, SOLUTION INTRAVENOUS at 03:01

## 2017-06-01 RX ADMIN — MIDODRINE HYDROCHLORIDE 10 MG: 5 TABLET ORAL at 09:36

## 2017-06-01 RX ADMIN — PRIMIDONE 200 MG: 50 TABLET ORAL at 00:07

## 2017-06-01 RX ADMIN — DOCUSATE SODIUM AND SENNOSIDES 1 TABLET: 8.6; 5 TABLET, FILM COATED ORAL at 18:03

## 2017-06-01 RX ADMIN — CEFAZOLIN 2 G: 1 INJECTION, POWDER, FOR SOLUTION INTRAMUSCULAR; INTRAVENOUS; PARENTERAL at 12:16

## 2017-06-01 RX ADMIN — ACETAMINOPHEN 650 MG: 325 TABLET, FILM COATED ORAL at 12:17

## 2017-06-01 NOTE — PROGRESS NOTES
Problem: Mobility Impaired (Adult and Pediatric)  Goal: *Acute Goals and Plan of Care (Insert Text)  Physical Therapy Goals  Initiated 6/1/2017    1. Patient will move from supine to sit and sit to supine in bed with modified independence within 4 days. 2. Patient will perform sit to stand with modified independence within 4 days. 3. Patient will ambulate with modified independence for 200 feet with the least restrictive device within 4 days. 4. Patient will verbalize and demonstrate understanding of hip precautions per protocol within 4 days. 5. Patient will perform hip home exercise program per protocol with independence within 4 days. PHYSICAL THERAPY EVALUATION  Patient: Salima Ruiz (16 y.o. female)  Date: 6/1/2017  Primary Diagnosis: UNKNOWN  Hip fracture (HCC)  Displaced fracture of right femoral neck, closed, initial encounter  Procedure(s) (LRB):  HIP HEMIARTHROPLASTY- RIGHT/ IP/ REQ. TF (Right) 1 Day Post-Op   Precautions:  Fall, WBAT, Total hip      ASSESSMENT :  Based on the objective data described below, the patient presents with decreased R hip ROM/strength, UE essential tremors (L worse than R) and decreased functional independence compared to her baseline. Pt was modified independent at baseline using rollator for her mobility. She was able to perform protocol hip exercises followed by transfer to the chair with RW, min A for safety. Pt with increased pain at this time and declined further ambulation. Pt is planning to discharge to the rehab portion at the [facility] when medically stable and agree with plans to maximize her functional independence and return to her baseline. Patient will benefit from skilled intervention to address the above impairments.   Patients rehabilitation potential is considered to be Good  Factors which may influence rehabilitation potential include:   [X]         None noted  [ ]         Mental ability/status  [ ]         Medical condition  [ ] Home/family situation and support systems  [ ]         Safety awareness  [ ]         Pain tolerance/management  [ ]         Other:        PLAN :  Recommendations and Planned Interventions:  [X]           Bed Mobility Training             [ ]    Neuromuscular Re-Education  [X]           Transfer Training                   [ ]    Orthotic/Prosthetic Training  [X]           Gait Training                         [ ]    Modalities  [X]           Therapeutic Exercises           [ ]    Edema Management/Control  [X]           Therapeutic Activities            [X]    Patient and Family Training/Education  [ ]           Other (comment):     Frequency/Duration: Patient will be followed by physical therapy  twice daily to address goals. Discharge Recommendations: Maximilian Johnson  Further Equipment Recommendations for Discharge: tbd       SUBJECTIVE:   Patient stated I am in a lot of pain.       OBJECTIVE DATA SUMMARY:   HISTORY:    Past Medical History:   Diagnosis Date    Arthritis      Liver disease      Other ill-defined conditions       GI PROBLEMS    Other ill-defined conditions       SARCOIDOSIS    Other ill-defined conditions       ANXIETY     Past Surgical History:   Procedure Laterality Date    HX CATARACT REMOVAL         AME.  W/LENS IMPLANT    HX DILATION AND CURETTAGE         X2    HX ORTHOPAEDIC         RT HAND MILLDLE FINGER GANGLION CYST REMOVAL    HX OTHER SURGICAL         EYELID SURGERY FOR DROOPY EYE LIDS    HX TONSILLECTOMY         Prior Level of Function/Home Situation: modified independent with rollator  Personal factors and/or comorbidities impacting plan of care:      Home Situation  Home Environment: Assisted living  [facility] [address] Name: [facility]   # Steps to Enter: 0  One/Two Story Residence: One story  Lift Chair Available: Yes  Living Alone: Yes  Support Systems: Family member(s), Assisted living  Patient Expects to be Discharged to[de-identified] Skilled nursing facility  Current DME Used/Available at Home: [place], straight, Walker, rolling, Walker, rollator, Other (comment), Shower chair, Grab bars (transport [employer])     EXAMINATION/PRESENTATION/DECISION MAKING:   Critical Behavior:  Neurologic State: Alert  Orientation Level: Oriented X4  Cognition: Appropriate for age attention/concentration, Appropriate decision making, Follows commands  Safety/Judgement: Awareness of environment  Hearing: Auditory  Auditory Impairment: None  Skin:  Appears intact     Range Of Motion:  AROM: Generally decreased, functional           Strength:    Strength: Generally decreased, functional                    Tone & Sensation:   Tone: Normal              Sensation: Intact               Coordination:  Coordination: Within functional limits  Functional Mobility:  Bed Mobility:     Supine to Sit: Contact guard assistance; Additional time        Transfers:  Sit to Stand: Minimum assistance  Stand to Sit: Minimum assistance                       Balance:   Sitting: Intact  Standing: Intact; With support  Ambulation/Gait Training:  Distance (ft): 3 Feet (ft)  Assistive Device: Walker, rolling;Gait belt  Ambulation - Level of Assistance: Minimal assistance     Gait Description (WDL): Exceptions to WDL  Gait Abnormalities: Antalgic;Decreased step clearance;Shuffling gait  Right Side Weight Bearing: As tolerated     Base of Support: Narrowed  Stance: Right decreased  Speed/Elana: Slow  Step Length: Right shortened;Left shortened                          Therapeutic Exercises:   Protocol hip exercises x 10 reps each      Functional Measure:  Tinetti test:      Sitting Balance: 1  Arises: 1  Attempts to Rise: 2  Immediate Standing Balance: 1  Standing Balance: 1  Nudged: 2  Eyes Closed: 1  Turn 360 Degrees - Continuous/Discontinuous: 1  Turn 360 Degrees - Steady/Unsteady: 1  Sitting Down: 1  Balance Score: 12  Indication of Gait: 1  R Step Length/Height: 1  L Step Length/Height: 1  R Foot Clearance: 1  L Foot Clearance: 1  Step Symmetry: 1  Step Continuity: 1  Path: 1  Trunk: 0  Walking Time: 1  Gait Score: 9  Total Score: 21         Tinetti Test and G-code impairment scale:  Percentage of Impairment CH     0%    CI     1-19% CJ     20-39% CK     40-59% CL     60-79% CM     80-99% CN      100%   Tinetti  Score 0-28 28 23-27 17-22 12-16 6-11 1-5 0          Tinetti Tool Score Risk of Falls  <19 = High Fall Risk  19-24 = Moderate Fall Risk  25-28 = Low Fall Risk  Tinetti ME. Performance-Oriented Assessment of Mobility Problems in Elderly Patients. Leonard 66; [id number]. (Scoring Description: PT Bulletin Feb. 10, 1993)     Older adults: Lisa Lal et al, 2009; n = 1000 [facility] elderly evaluated with ABC, JENNIFER, ADL, and IADL)  · Mean JENNIFER score for males aged 69-68 years = 26.21(3.40)  · Mean JENNIFER score for females age 69-68 years = 25.16(4.30)  · Mean JENNIFER score for males over 80 years = 23.29(6.02)  · Mean JENNIFER score for females over 80 years = 17.20(8.32)            G codes: In compliance with CMSs Claims Based Outcome Reporting, the following G-code set was chosen for this patient based on their primary functional limitation being treated: The outcome measure chosen to determine the severity of the functional limitation was the Tinetti with a score of 21/28 which was correlated with the impairment scale.       · Mobility - Walking and Moving Around:               - CURRENT STATUS:    CJ - 20%-39% impaired, limited or restricted               - GOAL STATUS:           CI - 1%-19% impaired, limited or restricted               - D/C STATUS:                       ---------------To be determined---------------      Physical Therapy Evaluation Charge Determination   History Examination Presentation Decision-Making   MEDIUM  Complexity : 1-2 comorbidities / personal factors will impact the outcome/ POC  MEDIUM Complexity : 3 Standardized tests and measures addressing body structure, function, activity limitation and / or participation in recreation  LOW Complexity : Stable, uncomplicated  Other outcome measures Tinetti  MEDIUM      Based on the above components, the patient evaluation is determined to be of the following complexity level: LOW      Pain:  Pain Scale 1: Numeric (0 - 10)  Pain Intensity 1: 9  Pain Location 1: Hip  Pain Orientation 1: Right  Pain Description 1: Aching  Pain Intervention(s) 1: Medication (see MAR), Ice provided  Activity Tolerance:   No apparent distress   Please refer to the flowsheet for vital signs taken during this treatment. After treatment:   [X]         Patient left in no apparent distress sitting up in chair  [ ]         Patient left in no apparent distress in bed  [X]         Call bell left within reach  [X]         Nursing notified  [ ]         Caregiver present  [ ]         Bed alarm activated      COMMUNICATION/EDUCATION:   The patients plan of care was discussed with: Registered Nurse, OT.  [X]         Fall prevention education was provided and the patient/caregiver indicated understanding. [X]         Patient/family have participated as able in goal setting and plan of care. [X]         Patient/family agree to work toward stated goals and plan of care. [ ]         Patient understands intent and goals of therapy, but is neutral about his/her participation. [ ]         Patient is unable to participate in goal setting and plan of care.      Thank you for this referral.  Joy Feliciano, PT   Time Calculation: 24 mins

## 2017-06-01 NOTE — PROGRESS NOTES
TRANSFER - OUT REPORT:    Verbal report given to Laurie rn(name) on Medardo Armenta  being transferred to  [facility](unit) for routine progression of care       Report consisted of patients Situation, Background, Assessment and   Recommendations(SBAR). Time Pre op antibiotic given:1945  Anesthesia Stop time: 2124  Toro Present on Transfer to floor:y  Order for Toro on Chart:y    Information from the following report(s) Procedure Summary was reviewed with the receiving nurse. Opportunity for questions and clarification was provided. Is the patient on 02? YES       L/Min 2       Other     Is the patient on a monitor? NO    Is the nurse transporting with the patient? YES    Surgical Waiting Area notified of patient's transfer from PACU?  YES      The following personal items collected during your admission accompanied patient upon transfer:   Dental Appliance: Dental Appliances: None  Vision: Visual Aid: Glasses  Hearing Aid:    Jewelry: Jewelry: None  Clothing: Clothing:  (no belongings to preop)  Other Valuables:    Valuables sent to safe:

## 2017-06-01 NOTE — PROGRESS NOTES
Care Management Interventions  PCP Verified by CM: Yes (Dr. Ossie Jeans)  Palliative Care Consult (Criteria: CHF and RRAT>21): No  Reason for No Palliative Care Consult: Other (see comment) (no needs)  Mode of Transport at Discharge: Other (see comment) (TBD)  Transition of Care Consult (CM Consult): Discharge Planning (Rehab/SNF vs HC at the Loc[facility] within [facility])  [address] [facility] [address] [place]: No  Discharge Durable Medical Equipment: No  Physical Therapy Consult: Yes  Occupational Therapy Consult: Yes  Speech Therapy Consult: No  Current Support Network: Assisted Living ([facility], her  lives in the [address] [place] [address] Way)  Confirm Follow Up Transport: Family  Plan discussed with Pt/Family/Caregiver: Yes  Freedom of Choice Offered: Yes  Discharge Location  Discharge Placement: Skilled nursing facility    Chart reviewed. CRM met with the patient and her daughter Eliseo Owen [phone] to discuss discharge planning. The patient resides at The [address] [place] [address],[address]. Her  resides in the [facility] there. The patient is hoping to go back to her AL, but may need health Care. CRM will follow for therapy recommendations. BABITA    CRM LVM for the JENIFER Yin at [facility] [phone].  BABITA

## 2017-06-01 NOTE — PROGRESS NOTES
Problem: Self Care Deficits Care Plan (Adult)  Goal: *Acute Goals and Plan of Care (Insert Text)  Occupational Therapy Goals  Initiated: 6/1/2017  1. Patient will perform grooming with supervision/set-up sitting in chair within 7 day(s). 2. Patient will perform bathing with min A from chair within 7 day(s). 3. Patient will perform upper body dressing min A within 7 day(s). 4. Patient will perform toilet transfers to [facility] with CGA within 7 day(s). 5. Patient will perform all aspects of toileting with CGA within 7 day(s). 6. Patient will perform lower body dressing with mod A within 7 days. OCCUPATIONAL THERAPY EVALUATION  Patient: Criss  (56 y.o. female)  Date: 6/1/2017  Primary Diagnosis: UNKNOWN  Hip fracture (HCC)  Displaced fracture of right femoral neck, closed, initial encounter  Procedure(s) (LRB):  HIP HEMIARTHROPLASTY- RIGHT/ IP/ REQ. TF (Right) 1 Day Post-Op   Precautions:   Fall, WBAT      ASSESSMENT :  Based on the objective data described below, the patient presents with decreased independence with self care and functional mobility following fall with right hip fracture. Pt s/p right hip hemiarthroplasty repair and now WBAT. Pt was able to progress with ADL activity from chair but this morning limited by pain and activity tolerance. She does continue with rodriguez at this time and will progress to [facility] as tolerated and able. Recommend discharge to rehab setting once ready as she is not safe to return to [facility]. Recommend OT to progress with toileting, dressing, and bathing activities to maximize her independence prior to return to her apartment. Patient will benefit from skilled intervention to address the above impairments.   Patients rehabilitation potential is considered to be Fair  Factors which may influence rehabilitation potential include:   [ ]             None noted  [ ]             Mental ability/status  [ ]             Medical condition  [ ]             Home/family situation and support systems  [ ]             Safety awareness  [ ]             Pain tolerance/management  [ ]             Other:        PLAN :  Recommendations and Planned Interventions:  [ ]               Self Care Training                  [ ]        Therapeutic Activities  [ ]               Functional Mobility Training    [ ]        Cognitive Retraining  [ ]               Therapeutic Exercises           [ ]        Endurance Activities  [ ]               Balance Training                   [ ]        Neuromuscular Re-Education  [ ]               Visual/Perceptual Training     [ ]   Home Safety Training  [ ]               Patient Education                 [ ]        Family Training/Education  [ ]               Other (comment):     Frequency/Duration: Patient will be followed by occupational therapy 5 times a week to address goals. Discharge Recommendations: Rehab  Further Equipment Recommendations for Discharge: none       SUBJECTIVE:   Patient stated I would like that  RE: brushing teeth      OBJECTIVE DATA SUMMARY:   HISTORY:   Past Medical History:   Diagnosis Date    Arthritis      Liver disease      Other ill-defined conditions       GI PROBLEMS    Other ill-defined conditions       SARCOIDOSIS    Other ill-defined conditions       ANXIETY     Past Surgical History:   Procedure Laterality Date    HX CATARACT REMOVAL         AME. W/LENS IMPLANT    HX DILATION AND CURETTAGE         X2    HX ORTHOPAEDIC         RT HAND MILLDLE FINGER GANGLION CYST REMOVAL    HX OTHER SURGICAL         EYELID SURGERY FOR DROOPY EYE LIDS    HX TONSILLECTOMY            Prior Level of Function/Home Situation: Pt was independent in her apartment. She lives in [address] with her  who is currently in health care.    Expanded or extensive additional review of patient history:      Home Situation  Home Environment: [address] Name: [facility]  # Steps to Enter: 0  One/Two Story Residence: One story  Lift Chair Available: Yes  Living Alone: Yes  Support Systems: Family member(s)  Patient Expects to be Discharged to[de-identified] Skilled nursing facility  Current DME Used/Available at Home: [place], straight, Walker, rolling, Shower chair, Grab bars  Tub or Shower Type: Shower  [X]  Right hand dominant             [ ]  Left hand dominant     EXAMINATION OF PERFORMANCE DEFICITS:  Cognitive/Behavioral Status:  Neurologic State: Alert  Orientation Level: Oriented X4  Cognition: Appropriate for age attention/concentration  Perception: Appears intact  Perseveration: No perseveration noted  Safety/Judgement: Good awareness of safety precautions     Skin: see nursing     Edema: none noted      Hearing: Auditory  Auditory Impairment: None     Vision/Perceptual:                           Acuity: Within Defined Limits          Range of Motion:  Pt noted with significant essential tremors in AME UE's but overall functional with increased time. AROM: Generally decreased, functional  PROM: Generally decreased, functional                       Strength:     Strength: Generally decreased, functional                 Coordination:  Coordination: Generally decreased, functional  Fine Motor Skills-Upper: Right Intact; Left Intact    Gross Motor Skills-Upper: Right Intact; Left Intact     Tone & Sensation:     Tone: Normal (Essential tremors AME UE's)  Sensation: Intact                       Balance:  Sitting: Intact  Standing: Intact; With support     Functional Mobility and Transfers for ADLs:  Bed Mobility:  Supine to Sit:  (recieved in chair)  Sit to Supine:  (remained in chair)     Transfers:  Sit to Stand: Minimum assistance  Stand to Sit: Minimum assistance  Bed to Chair: Minimum assistance  Toilet Transfer : Minimum assistance     ADL Assessment:  Feeding: Supervision     Oral Facial Hygiene/Grooming: Supervision     Bathing:  Moderate assistance     Upper Body Dressing: Minimum assistance     Lower Body Dressing: Maximum assistance Toileting: Total assistance                 ADL Intervention and task modifications:   Pt completed grooming from chair level. Pt tolerated activity well this morning. breakfast arrived during session and provided with setup for breakfast.  Pt needing assistance to manage all containers on tray due to tremors. She is able to feed self but question if she could benefit from weight wrist cuff. Cognitive Retraining  Safety/Judgement: Good awareness of safety precautions     Functional Measure:  Barthel Index:      Bathin  Bladder: 0  Bowels: 10  Groomin  Dressin  Feedin  Mobility: 5  Stairs: 0  Toilet Use: 0  Transfer (Bed to Chair and Back): 10  Total: 35         Barthel and G-code impairment scale:  Percentage of impairment CH  0% CI  1-19% CJ  20-39% CK  40-59% CL  60-79% CM  80-99% CN  100%   Barthel Score 0-100 100 99-80 79-60 59-40 20-39 1-19    0   Barthel Score 0-20 20 17-19 13-16 9-12 5-8 1-4 0      The Barthel ADL Index: Guidelines  1. The index should be used as a record of what a patient does, not as a record of what a patient could do. 2. The main aim is to establish degree of independence from any help, physical or verbal, however minor and for whatever reason. 3. The need for supervision renders the patient not independent. 4. A patient's performance should be established using the best available evidence. Asking the patient, friends/relatives and nurses are the usual sources, but direct observation and common sense are also important. However direct testing is not needed. 5. Usually the patient's performance over the preceding 24-48 hours is important, but occasionally longer periods will be relevant. 6. Middle categories imply that the patient supplies over 50 per cent of the effort. 7. Use of aids to be independent is allowed. Negra Townsend., Barthel, D.W. (3074). Functional evaluation: the Barthel Index. [address] [facility] (14)2.   NANCY Pulido, Fouzia Resendiz [place] (1999). Measuring the change indisability after inpatient rehabilitation; comparison of the responsiveness of the Barthel Index and Functional [place] Measure. Journal of Neurology, Neurosurgery, and Psychiatry, 66(4), 150-248. VINEET Mejia, SHIRA Finn, & Priscila Barrera M.A. (2004.) Assessment of post-stroke quality of life in cost-effectiveness studies: The usefulness of the Barthel Index and the EuroQoL-5D. Quality of Life Research, 13, 708-20      G codes: In compliance with CMSs Claims Based Outcome Reporting, the following G-code set was chosen for this patient based on their primary functional limitation being treated: The outcome measure chosen to determine the severity of the functional limitation was the Barthel Index with a score of 35/100 which was correlated with the impairment scale. · Self Care:               - CURRENT STATUS:    CL - 60%-79% impaired, limited or restricted               - GOAL STATUS:           CJ - 20%-39% impaired, limited or restricted               - D/C STATUS:                       ---------------To be determined---------------      Occupational Therapy Evaluation Charge Determination   History Examination Decision-Making   LOW Complexity : Brief history review  HIGH Complexity : 5 or more performance deficits relating to physical, cognitive , or psychosocial skils that result in activity limitations and / or participation restrictions MEDIUM Complexity : Patient may present with comorbidities that affect occupational performnce.  Miniml to moderate modification of tasks or assistance (eg, physical or verbal ) with assesment(s) is necessary to enable patient to complete evaluation       Based on the above components, the patient evaluation is determined to be of the following complexity level: LOW   Pain:  Pain Scale 1: Numeric (0 - 10)  Pain Intensity 1: 7  Pain Location 1: Hip  Pain Orientation 1: Right  Pain Description 1: Aching  Pain Intervention(s) 1: Medication (see MAR)  Activity Tolerance:   VSS throughout session. After treatment:   [X] Patient left in no apparent distress sitting up in chair  [ ] Patient left in no apparent distress in bed  [X] Call bell left within reach  [X] Nursing notified  [ ] Caregiver present  [ ] Bed alarm activated      COMMUNICATION/EDUCATION:   The patients plan of care was discussed with: Physical Therapist and Registered Nurse.  [X] Home safety education was provided and the patient/caregiver indicated understanding. [X] Patient/family have participated as able in goal setting and plan of care. [ ] Patient/family agree to work toward stated goals and plan of care. [ ] Patient understands intent and goals of therapy, but is neutral about his/her participation. [ ] Patient is unable to participate in goal setting and plan of care. This patients plan of care is appropriate for delegation to [address] [place] [address].      Thank you for this referral.  Glenn Pabon OT  Time Calculation: 30 mins

## 2017-06-01 NOTE — PROGRESS NOTES
Primary Nurse Geno Solares RN and JIM Adkins performed a dual skin assessment on this patient No impairment noted. Pt has a lipoma on her right posterior flank that is hard and round. Pt has an old decub that has healed. There is no breakdown or open skin. Pt has neurotrasnmitters implanted bilateral upper chest that she controls and turns on and off. Jhon score is 19.

## 2017-06-01 NOTE — PROGRESS NOTES
Ortho Progress Note  1 Day Post-Op  Procedure(s):  HIP HEMIARTHROPLASTY- RIGHT/ IP/ REQ. TF    Subjective: No acute events overnight. Pt doing well this am, mild pain around incision. Pt denies SOB, lightheadedness, dizziness, CP, N/V, or abdominal pain. Physical Exam:   Visit Vitals    /55 (BP 1 Location: Left arm, BP Patient Position: At rest)    Pulse 78    Temp 97.6 °F (36.4 °C)    Resp 15    Ht 5' 10\" (1.778 m)    Wt 60.8 kg (134 lb)    SpO2 98%    BMI 19.23 kg/m2     General appearance: alert, cooperative, no distress, appears stated age  Abdomen: soft, non-tender.  Bowel sounds normal. No masses,  no organomegaly  Extremities: Homans sign is negative, no sign of DVT, passive ROM of R hip without pain, no pain w/ palpation R thigh, no swelling noted, no calf pain or swelling  Pulses: 2+ and symmetric  Wound: aquacel dressing c/d/i, no evidence of drainage  Neurologic: Grossly normal, ankle dorsi/plantar flexion intact, limited heel raise    Recent Results (from the past 24 hour(s))   EKG, 12 LEAD, INITIAL    Collection Time: 05/31/17  3:24 PM   Result Value Ref Range    Ventricular Rate 62 BPM    Atrial Rate 62 BPM    P-R Interval 166 ms    QRS Duration 92 ms    Q-T Interval 472 ms    QTC Calculation (Bezet) 479 ms    Calculated P Axis 72 degrees    Calculated R Axis -6 degrees    Calculated T Axis 37 degrees    Diagnosis       Atrial-paced rhythm  When compared with ECG of 04-MAR-2011 10:03,  No significant change was found  Confirmed by Arsen Graves M.D., Delfino Lara ([id number]) on 5/31/2017 4:90:40 PM     METABOLIC PANEL, COMPREHENSIVE    Collection Time: 05/31/17  3:44 PM   Result Value Ref Range    Sodium 142 136 - 145 mmol/L    Potassium 3.5 3.5 - 5.1 mmol/L    Chloride 105 97 - 108 mmol/L    CO2 29 21 - 32 mmol/L    Anion gap 8 5 - 15 mmol/L    Glucose 88 65 - 100 mg/dL    BUN 15 6 - 20 MG/DL    Creatinine 0.81 0.55 - 1.02 MG/DL    BUN/Creatinine ratio 19 12 - 20      GFR est AA >60 >60 ml/min/1.73m2    GFR est non-AA >60 >60 ml/min/1.73m2    Calcium 9.3 8.5 - 10.1 MG/DL    Bilirubin, total 0.5 0.2 - 1.0 MG/DL    ALT (SGPT) 17 12 - 78 U/L    AST (SGOT) 13 (L) 15 - 37 U/L    Alk. phosphatase 125 (H) 45 - 117 U/L    Protein, total 7.6 6.4 - 8.2 g/dL    Albumin 3.6 3.5 - 5.0 g/dL    Globulin 4.0 2.0 - 4.0 g/dL    A-G Ratio 0.9 (L) 1.1 - 2.2     CBC WITH AUTOMATED DIFF    Collection Time: 05/31/17  3:44 PM   Result Value Ref Range    WBC 4.0 3.6 - 11.0 K/uL    RBC 4.10 3.80 - 5.20 M/uL    HGB 12.0 11.5 - 16.0 g/dL    HCT 36.3 35.0 - 47.0 %    MCV 88.5 80.0 - 99.0 FL    MCH 29.3 26.0 - 34.0 PG    MCHC 33.1 30.0 - 36.5 g/dL    RDW 15.1 (H) 11.5 - 14.5 %    PLATELET 759 906 - 445 K/uL    NEUTROPHILS 56 32 - 75 %    LYMPHOCYTES 31 12 - 49 %    MONOCYTES 10 5 - 13 %    EOSINOPHILS 1 0 - 7 %    BASOPHILS 2 (H) 0 - 1 %    ABS. NEUTROPHILS 2.3 1.8 - 8.0 K/UL    ABS. LYMPHOCYTES 1.2 0.8 - 3.5 K/UL    ABS. MONOCYTES 0.4 0.0 - 1.0 K/UL    ABS. EOSINOPHILS 0.0 0.0 - 0.4 K/UL    ABS.  BASOPHILS 0.1 0.0 - 0.1 K/UL   PROTHROMBIN TIME + INR    Collection Time: 05/31/17  3:44 PM   Result Value Ref Range    INR 1.1 0.9 - 1.1      Prothrombin time 11.5 (H) 9.0 - 11.1 sec   MAGNESIUM    Collection Time: 05/31/17  3:44 PM   Result Value Ref Range    Magnesium 2.3 1.6 - 2.4 mg/dL   TYPE & SCREEN    Collection Time: 05/31/17  6:48 PM   Result Value Ref Range    Crossmatch Expiration 06/03/2017     ABO/Rh(D) A NEGATIVE     Antibody screen NEG    URINALYSIS W/ REFLEX CULTURE    Collection Time: 06/01/17  2:55 AM   Result Value Ref Range    Color YELLOW/STRAW      Appearance CLOUDY (A) CLEAR      Specific gravity 1.028 1.003 - 1.030      pH (UA) 6.0 5.0 - 8.0      Protein NEGATIVE  NEG mg/dL    Glucose NEGATIVE  NEG mg/dL    Ketone NEGATIVE  NEG mg/dL    Bilirubin NEGATIVE  NEG      Blood MODERATE (A) NEG      Urobilinogen 0.2 0.2 - 1.0 EU/dL    Nitrites NEGATIVE  NEG      Leukocyte Esterase LARGE (A) NEG      WBC >100 (H) 0 - 4 /hpf    RBC 10-20 0 - 5 /hpf    Epithelial cells FEW FEW /lpf    Bacteria NEGATIVE  NEG /hpf    UA:UC IF INDICATED URINE CULTURE ORDERED (A) CNI      Hyaline cast 2-5 0 - 5 /lpf   METABOLIC PANEL, BASIC    Collection Time: 06/01/17  2:55 AM   Result Value Ref Range    Sodium 138 136 - 145 mmol/L    Potassium 3.7 3.5 - 5.1 mmol/L    Chloride 103 97 - 108 mmol/L    CO2 25 21 - 32 mmol/L    Anion gap 10 5 - 15 mmol/L    Glucose 139 (H) 65 - 100 mg/dL    BUN 14 6 - 20 MG/DL    Creatinine 0.85 0.55 - 1.02 MG/DL    BUN/Creatinine ratio 16 12 - 20      GFR est AA >60 >60 ml/min/1.73m2    GFR est non-AA >60 >60 ml/min/1.73m2    Calcium 9.0 8.5 - 10.1 MG/DL   HEMOGLOBIN    Collection Time: 06/01/17  2:55 AM   Result Value Ref Range    HGB 11.0 (L) 11.5 - 16.0 g/dL       Assessment:  Stable Post Op    Plan:  Lovenox 40mg SQ x 10d  Physical Therapy: WBAT  Discharge Planning  Pain control: tylenol, mobic  Acute blood loss anemia: hgb 11.0, normal post op finding, pt asymptomatic, will continue to monitor

## 2017-06-01 NOTE — BRIEF OP NOTE
BRIEF OPERATIVE NOTE    Date of Procedure: 5/31/2017   Preoperative Diagnosis: right hip displaced femoral neck fracture  Postoperative Diagnosis: Same   Procedure(s):  HIP HEMIARTHROPLASTY- RIGHT  Surgeon(s) and Role:     * Jd Scott MD - Primary         Assistant Staff:  Physician Assistant: Farideh López PA-C    Surgical Staff:  Circ-1: Hair Cortes RN  Physician Assistant: Farideh López PA-C  Scrub RN-1: Arvin Macias RN  Surg Asst-1: Tan Rincon  Event Time In   Incision Start 2005   Incision Close      Anesthesia: General   Estimated Blood Loss: 100cc  Specimens: * No specimens in log *   Findings: as above   Complications: none  Implants:   Implant Name Type Inv.  Item Serial No.  Lot No. LRB No. Used Action   HEAD FEM [employer] [id number] BRN --  - SN/A   HEAD FEM [facility] [id number] BRN --  N[facility]A [facility] [id number] Right 1 Implanted

## 2017-06-01 NOTE — ANESTHESIA POSTPROCEDURE EVALUATION
Post-Anesthesia Evaluation and Assessment    Patient: Sophie Martini MRN: [id number]  SSN: [id number]    YOB: 1940  Age: 68 y.o. Sex: female       Cardiovascular Function/Vital Signs  Visit Vitals    BP (!) 150/94 (BP 1 Location: Left arm, BP Patient Position: At rest)    Pulse 89    Temp 36.1 °C (96.9 °F)    Resp 14    Ht 5' 10\" (1.778 m)    Wt 60.8 kg (134 lb)    SpO2 99%    BMI 19.23 kg/m2       Patient is status post general anesthesia for Procedure(s):  HIP HEMIARTHROPLASTY- RIGHT/ IP/ REQ. TF.    Nausea/Vomiting: None    Postoperative hydration reviewed and adequate. Pain:  Pain Scale 1: Numeric (0 - 10) (05/31/17 2300)  Pain Intensity 1: 0 (05/31/17 2300)   Managed    Neurological Status:   Neuro (WDL): Exceptions to WDL (05/31/17 2124)  Neuro  Neurologic State: Alert (05/31/17 2300)  Orientation Level: Oriented X4 (05/31/17 2300)  LUE Motor Response: Weak;Tremorous (05/31/17 2300)  LLE Motor Response: Weak (05/31/17 2300)  RUE Motor Response: Weak;Tremorous (05/31/17 2300)  RLE Motor Response: Weak (05/31/17 2300)   At baseline    Mental Status and Level of Consciousness: Arousable    Pulmonary Status:   O2 Device: Nasal cannula (05/31/17 2300)   Adequate oxygenation and airway patent    Complications related to anesthesia: None    Post-anesthesia assessment completed.  No concerns    Signed By: Hansel Aaruz MD     May 31, 2017

## 2017-06-01 NOTE — PROGRESS NOTES
Hospitalist Progress Note      Hospital summary: 68 y.o lady with afib, orthostatic hypotension, who presented with a right hip fracture after a fall. Assessment/Plan:  Right displaced femoral neck fracture: s/p R hip hemiarthroplasty on 5/31  -pain control  -PT    Abnormal UA: significant pyuria, not sure why this was sent. She denies any urinary symptoms, but there is already a culture in lab. Afib: s/p pacemaker currently paced    Orthostatic hypotension: stable  -continue fludrocortisone and midodrine    Anxiety:   -continue lexapro    Anemia: mild expected blood loss post-op; monitor    Code status: full  DVT prophylaxis: sq Lovenox  Disposition: will likely need rehab  ----------------------------------------------    CC: right hip pain    S: hip is painful at incision site, hasn't been out of bed yet. Denies any dyspnea, n/v/d. Review of Systems:  Pertinent items are noted in HPI.     O:  Visit Vitals    /64 (BP 1 Location: Left arm, BP Patient Position: At rest)    Pulse 77    Temp 97.5 °F (36.4 °C)    Resp 16    Ht 5' 10\" (1.778 m)    Wt 60.8 kg (134 lb)    SpO2 93%    BMI 19.23 kg/m2       PHYSICAL EXAM:  Gen: NAD, non-toxic  HEENT: anicteric sclerae, normal conjunctiva  Neck: supple  Heart: RRR, no MRG, no JVD, no peripheral edema  Lungs: CTA b/l, non-labored respirations  Abd: soft, NT, ND, BS+  Extr: warm, non-edematous  Neuro: CN grossly intact, normal speech, moves all extr  Psych: normal mood, appropriate affect      Intake/Output Summary (Last 24 hours) at 06/01/17 0940  Last data filed at 06/01/17 7734   Gross per 24 hour   Intake             1000 ml   Output             1625 ml   Net             -625 ml        Recent labs & imaging reviewed:  Recent Labs      06/01/17   0255  05/31/17   1544   WBC   --   4.0   HGB  11.0*  12.0   HCT   --   36.3   PLT   --   242     Recent Labs      06/01/17   0255  05/31/17   1544   NA  138  142   K 3.7  3.5   CL  103  105   CO2  25  29   BUN  14  15   CREA  0.85  0.81   GLU  139*  88   CA  9.0  9.3   MG   --   2.3     Recent Labs      05/31/17   1544   SGOT  13*   ALT  17   AP  125*   TBILI  0.5   TP  7.6   ALB  3.6   GLOB  4.0     Recent Labs      05/31/17   1544   INR  1.1   PTP  11.5*        Med list reviewed  Current Facility-Administered Medications   Medication Dose Route Frequency    clonazePAM (KlonoPIN) tablet 1 mg  1 mg Oral BID PRN    escitalopram oxalate (LEXAPRO) tablet 20 mg  20 mg Oral DAILY    fludrocortisone (FLORINEF) tablet 100 mcg  0.1 mg Oral DAILY    midodrine (PROAMITINE) tablet 10 mg  10 mg Oral TID WITH MEALS    therapeutic multivitamin (THERAGRAN) tablet 1 Tab  1 Tab Oral DAILY    primidone (MYSOLINE) tablet 200 mg  200 mg Oral QHS    sodium chloride (NS) flush 5-10 mL  5-10 mL IntraVENous Q8H    sodium chloride (NS) flush 5-10 mL  5-10 mL IntraVENous PRN    naloxone (NARCAN) injection 0.4 mg  0.4 mg IntraVENous PRN    sodium chloride (NS) flush 5-10 mL  5-10 mL IntraVENous Q8H    sodium chloride (NS) flush 5-10 mL  5-10 mL IntraVENous PRN    naloxone (NARCAN) injection 0.4 mg  0.4 mg IntraVENous PRN    calcium-vitamin D (OS-JOCELYN) 500 mg-200 unit tablet  1 Tab Oral TID WITH MEALS    senna-docusate (PERICOLACE) 8.6-50 mg per tablet 1 Tab  1 Tab Oral BID    polyethylene glycol (MIRALAX) packet 17 g  17 g Oral DAILY    [START ON 6/2/2017] bisacodyl (DULCOLAX) suppository 10 mg  10 mg Rectal DAILY PRN    acetaminophen (TYLENOL) tablet 650 mg  650 mg Oral Q6H    traMADol (ULTRAM) tablet 50 mg  50 mg Oral Q6H PRN    oxyCODONE IR (ROXICODONE) tablet 2.5 mg  2.5 mg Oral Q4H PRN    oxyCODONE IR (ROXICODONE) tablet 5 mg  5 mg Oral Q4H PRN    morphine injection 1 mg  1 mg IntraVENous Q4H PRN    hydrOXYzine HCl (ATARAX) tablet 10 mg  10 mg Oral Q8H PRN    ceFAZolin in 0.9% NS (ANCEF) IVPB soln 2 g  2 g IntraVENous Q8H    ondansetron (ZOFRAN ODT) tablet 4 mg  4 mg Oral Q4H PRN    ondansetron (ZOFRAN) injection 4 mg  4 mg IntraVENous Q4H PRN    enoxaparin (LOVENOX) injection 40 mg  40 mg SubCUTAneous DAILY    meloxicam (MOBIC) tablet 15 mg  15 mg Oral DAILY       Care Plan discussed with:  Patient/Family    Luis Ruelas MD  Internal Medicine  Date of Service: 6/1/2017

## 2017-06-01 NOTE — PROGRESS NOTES
Bedside shift change report given to Tomas Diallo RN (oncoming nurse) by Shaniqua Jain RN (offgoing nurse). Report given with SBAR, Kardex, Intake/Output and MAR.

## 2017-06-01 NOTE — OP NOTES
[address] [facility] [address] [place] [address], [address]   OP NOTE       Name:  Adolph Hernández   MR#:  [id number]   :  1940   Account #:  [de-identified]    Surgery Date:  2017   Date of Adm:  2017       ATTENDING PHYSICIAN: Jefe Pichardo MD    ASSISTANT: Suraj Brothers PA-C    PREOPERATIVE DIAGNOSIS: Right hip displaced femoral neck   fracture. POSTOPERATIVE DIAGNOSIS: Right hip displaced femoral neck   fracture. PROCEDURES PERFORMED: Right hip hemiarthroplasty. ANESTHESIA: General.    ESTIMATED BLOOD LOSS: 100 mL. SPECIMENS REMOVED: Femoral head. INDICATIONS: A 77-year-old ambulatory patient presents with a   displaced femoral neck fracture as a result of a fall approximately 3   weeks ago. She has continued to have pain since her fall. She   presented to the office today where x-rays revealed a displaced   femoral neck fracture. OPERATION: The patient taken to the operating room, underwent   general inhalational anesthesia. She was placed on the Novinger table   with both feet in boots. Intermittent compression hose were placed on   bilateral lower legs. Right hip and leg were prepped and draped in the   usual fashion. A standard anterior approach was made to the hip down through skin   and subcutaneous tissue. Fascia was incised longitudinally. Tensor   muscle was dissected off the fascia and retracted posterolaterally. Fascia was incised and the rectus muscle was retracted   anteromedially. The circumflex vessels were clamped and cauterized. Rectus was elevated off the anterior capsule and the capsule was   incised in a T-shaped fashion. The capsule was dissected   subperiosteally. Each side of the capsule was tagged with #2 Vicryl   suture. Retractors were placed intracapsular. The hip was externally   rotated to 50 degrees. Femoral neck was recut at the appropriate level. The fracture had partially healed.  Corkscrew was used to capture the femoral head and remove it from the acetabulum. The femoral head   was sized to a size 50. Femoral elevator was placed around the femur   and femoral retractor was used to retract the femur anterolaterally. The   leg was brought down into hyperextension and external rotation. Capsular releases were performed. Femoral elevator was used to   bring the proximal femur up into the wound. After further capsular   releases, we had excellent exposure of the proximal femur. Canal was   reamed up to a size 6 tapered reamer. Canal was broached up to a   size 6 broach. Borges Cody was removed. Canal was cleansed using   pulsatile lavage system with antibiotic solution. A size 6 [place] stem   was impacted down the intramedullary canal with good fixation. Size   50 bipolar component with a 1.5 head was impacted onto the femoral   component. The hip was then reduced and excellent range of motion   and stability was noted. Fluoroscopy was used to confirm placement of   our implants and leg lengths. The joint was extensively irrigated with antibiotic solution. Capsule was   repaired anatomically using #2 Vicryl interrupted figure-of-eight   fashion. Fascia was repaired using #1 Stratafix in a running fashion. Subcutaneous tissue was approximated using 2-0 Vicryl in interrupted   fashion. The skin edges were approximated using 3-0 Monocryl in   running subcuticular fashion, followed by Dermabond. Sterile dressing   was applied and the patient was awakened, extubated and transferred   to the recovery room in stable condition. There were no complications.         MD BERNADINE Jarquin / Tonja العراقي   D:  05/31/2017   20:53   T:  05/31/2017   23:21   Job #:  [id number]

## 2017-06-01 NOTE — PROGRESS NOTES
TRANSFER - IN REPORT:    Verbal report received from JohnsonRN(name) on Josué Stover  being received from PACU(unit) for routine post - op      Report consisted of patients Situation, Background, Assessment and   Recommendations(SBAR). Information from the following report(s) SBAR, Kardex, OR Summary, Procedure Summary, Intake/Output, MAR, Accordion and Recent Results was reviewed with the receiving nurse. Opportunity for questions and clarification was provided. Assessment completed upon patients arrival to unit and care assumed.

## 2017-06-01 NOTE — PROGRESS NOTES
Problem: Mobility Impaired (Adult and Pediatric)  Goal: *Acute Goals and Plan of Care (Insert Text)  Physical Therapy Goals  Initiated 6/1/2017    1. Patient will move from supine to sit and sit to supine in bed with modified independence within 4 days. 2. Patient will perform sit to stand with modified independence within 4 days. 3. Patient will ambulate with modified independence for 200 feet with the least restrictive device within 4 days. 4. Patient will verbalize and demonstrate understanding of hip precautions per protocol within 4 days. 5. Patient will perform hip home exercise program per protocol with independence within 4 days. PHYSICAL THERAPY TREATMENT  Patient: Yolanda Villa (6 y.o. female)  Date: 6/1/2017  Diagnosis: UNKNOWN  Hip fracture (HCC)  Displaced fracture of right femoral neck, closed, initial encounter Displaced fracture of right femoral neck (HCC)  Procedure(s) (LRB):  HIP HEMIARTHROPLASTY- RIGHT/ IP/ REQ. TF (Right) 1 Day Post-Op  Precautions: Fall, WBAT      ASSESSMENT:  Pt received resting in bed and agreeable to mobilize despite increased pain at this time. She was able to come to EOB with supervision and CGA to stand to RW. Pt with increased balance deficits compared to her baseline requiring CGA-min A to safely ambulate 50 ft with RW. Pt's tremors seem to be her limiting factor which increases her fall risk at this time. Pt was at [facility] at baseline and feel like she may need additional rehab before returning home. Recommend rehab to maximize her functional independence and work towards her prior level of function. Progression toward goals:  [ ]      Improving appropriately and progressing toward goals  [X]      Improving slowly and progressing toward goals  [ ]      Not making progress toward goals and plan of care will be adjusted       PLAN:  Patient continues to benefit from skilled intervention to address the above impairments.   Continue treatment per established plan of care. Discharge Recommendations:  Rehab  Further Equipment Recommendations for Discharge:  tbd       SUBJECTIVE:   Patient stated I guess I will try to walk.       OBJECTIVE DATA SUMMARY:   Functional Mobility Training:  Bed Mobility:     Supine <> Sit: Supervision        Transfers:  Sit to Stand: Contact guard assistance  Stand to Sit: Contact guard assistance        Bed to Chair: Minimum assistance                    Ambulation/Gait Training:  Distance (ft): 30 Feet (ft)  Assistive Device: Walker, rolling;Gait belt  Ambulation - Level of Assistance: Contact guard assistance;Minimal assistance     Gait Description (WDL): Exceptions to WDL  Gait Abnormalities: Antalgic;Decreased step clearance  Right Side Weight Bearing: As tolerated     Base of Support: Narrowed  Stance: Right decreased  Speed/Elana: Slow  Step Length: Right shortened;Left shortened           Therapeutic Exercises:   Exercises performed per protocol. See morning treatment note for description. Pain:  Pain Scale 1: Numeric (0 - 10)  Pain Intensity 1: 7  Pain Location 1: Hip  Pain Orientation 1: Right  Pain Description 1: Aching  Pain Intervention(s) 1: Medication (see MAR)  Activity Tolerance:   No apparent distress   Please refer to the flowsheet for vital signs taken during this treatment.   After treatment:   [ ] Patient left in no apparent distress sitting up in chair  [ ] Patient left in no apparent distress in bed  [ ] Call bell left within reach  [ ] Nursing notified  [ ] Caregiver present  [ ] Bed alarm activated      COMMUNICATION/COLLABORATION:   The patients plan of care was discussed with: Registered Nurse     Yared Celeste, PT   Time Calculation: 16 mins

## 2017-06-01 NOTE — PROGRESS NOTES
NUTRITION COMPLETE ASSESSMENT    RECOMMENDATIONS:   1. Regular diet  2. RD add Ensure Enlive BID  3. Staff please set-up meal trays  4. Diet office assist pt with room service meal selects  5. Staff encourage fluids and assist to increase PO foods and fluids     Interventions/Plan:   Food/Nutrient Delivery:           meals and snacks; commercial supplements  Nutrition Education:     Coordination of Care:    Nutrition Counseling:        Assessment:   Reason for Assessment:   [x] Provider Consult: Geriatric hip fx      Diet:  Regular  Supplements: RD add Ensure Enlive BID (van and chocolate)  Nutritionally Significant Medications: [x] Reviewed & Includes: Pericolace, Miralax, theragran, Oscal D   Meal Intake: Patient Vitals for the past 100 hrs:   % Diet Eaten   06/01/17 1437 10 %       Subjective:  Appetite poor now. Drink Ensure at home (raquel or Jahaira lizeth). Weight used to be as high as 160#, then dropped to about 145#, now around 135#. Had BM day before yesterday. Chew/swallow ok, have two implants and natural teeth. Objective:  Hx anxiety, SBO with resect, decubitus wound (healed); A-fib w/pacemaker. Admit R-hip fx s/p hemiarthroplasty. BMI 19 underweight. No recent encounters to trend wts. Pt claims did weigh 145# and RD verified 147# (3/4/2011), but not recent wts. Has lost some weight, but can't quantify. Appetite poor. Visited after lunch and accepted only a few bites off tray. Drinks Ensure PTA and plan to add Ensure Enlive BID (van and raquel) = 700 kcal & 40 gm pro meets 37% kcal and 53% pro needs respectively. Left PB crackers at bedside. No hx DM,  today, 88 yesterday. No difficulty chew/swallow and no hx dysphagia. Rx Pericolace and Mirilax. Offered prune juice and high fiber foods. Pt claims, \"doesn't drink enough water\". Needs assist/encouragment to increase foods and fluids.      Estimated Nutrition Needs:   Kcals/day: 1900 Kcals/day  Protein: 75 g (~1.2 gm/kg)  Fluid: 2050 ml (~30 mL/kg IBW)     Based On: Freddy Tolentino (MSJ x 1.2 + 500 kcal/day to promote wt gain)  Weight Used:  60.8kg (? Source)    Pt expected to meet estimated nutrient needs:   [x]  No, not without ONS  Comparative Standards:  ;  ;      Nutrition Diagnosis:   1. Inadequate oral food/beverage intake related to appetite decline as evidenced by pt reports poor appetite, drinks Ensure PTA, BMI 19 underweight and ate 10% at lunch    2. Increased protein needs related to srugical healing as evidenced by geriatric hip fx s/p arthroplasty with poor appetite    Goals:     Consume minimum 100% ONS BID starting in next 24 hr     Monitoring & Evaluation:    - Total energy intake, Oral fluids amount, Protein intake   - Weight/weight change, Lean body mass, fat free mass, Protein profile    Previous Nutrition Goals Met:  N/A  Previous Recommendations:      N/A    Education & Discharge Needs:   [x] None Identified   [x] Participated in care plan, discharge planning, and/or interdisciplinary rounds        Cultural, Taoism and ethnic food preferences identified:   None    Skin Integrity: [x]Intact s/p R-hip arthroplasty  Edema: [x]None   Last BM: 5/30/17  Food Allergies: [x]NKA    Anthropometrics:    Weight Loss Metrics 5/31/2017 3/9/2011 3/4/2011   Today's Wt 134 lb 148 lb 147 lb   BMI 19.23 kg/m2 21.24 kg/m2 21.09 kg/m2      Last 3 Recorded Weights in this Encounter    05/31/17 1356   Weight: 60.8 kg (134 lb)         Height: 5' 10\" (177.8 cm),    Body mass index is 19.23 kg/(m^2).   IBW : 68 kg (150 lb),    Usual Body Weight: 65.8 kg (145 lb) (Can't remember when, think \"years ago\"),      Labs:  Lab Results   Component Value Date/Time    Sodium 138 06/01/2017 02:55 AM    Potassium 3.7 06/01/2017 02:55 AM    Chloride 103 06/01/2017 02:55 AM    CO2 25 06/01/2017 02:55 AM    Glucose 139 06/01/2017 02:55 AM    BUN 14 06/01/2017 02:55 AM    Creatinine 0.85 06/01/2017 02:55 AM    Calcium 9.0 06/01/2017 02:55 AM    Magnesium 2.3 05/31/2017 03:44 PM    Albumin 3.6 05/31/2017 03:44 PM     No results found for: HBA1C, HGBE8, EJI3PUTD, TPC2ZXUW  Lab Results   Component Value Date/Time    Glucose 139 06/01/2017 02:55 AM      Lab Results   Component Value Date/Time    ALT (SGPT) 17 05/31/2017 03:44 PM    AST (SGOT) 13 05/31/2017 03:44 PM    Alk.  phosphatase 125 05/31/2017 03:44 PM    Bilirubin, total 0.5 05/31/2017 03:44 PM        Cristian Ornelsa RD

## 2017-06-02 LAB
BACTERIA SPEC CULT: NORMAL
CC UR VC: NORMAL
HGB BLD-MCNC: 9.9 G/DL (ref 11.5–16)
SERVICE CMNT-IMP: NORMAL

## 2017-06-02 PROCEDURE — 74011250636 HC RX REV CODE- 250/636: Performed by: PHYSICIAN ASSISTANT

## 2017-06-02 PROCEDURE — 36415 COLL VENOUS BLD VENIPUNCTURE: CPT | Performed by: PHYSICIAN ASSISTANT

## 2017-06-02 PROCEDURE — 97530 THERAPEUTIC ACTIVITIES: CPT

## 2017-06-02 PROCEDURE — 51798 US URINE CAPACITY MEASURE: CPT

## 2017-06-02 PROCEDURE — 74011250637 HC RX REV CODE- 250/637: Performed by: PHYSICIAN ASSISTANT

## 2017-06-02 PROCEDURE — 85018 HEMOGLOBIN: CPT | Performed by: PHYSICIAN ASSISTANT

## 2017-06-02 PROCEDURE — 97116 GAIT TRAINING THERAPY: CPT

## 2017-06-02 PROCEDURE — 77030012935 HC DRSG AQUACEL BMS -B

## 2017-06-02 PROCEDURE — 74011250636 HC RX REV CODE- 250/636: Performed by: HOSPITALIST

## 2017-06-02 PROCEDURE — 74011250637 HC RX REV CODE- 250/637: Performed by: FAMILY MEDICINE

## 2017-06-02 PROCEDURE — 65660000000 HC RM CCU STEPDOWN

## 2017-06-02 RX ADMIN — OXYCODONE HYDROCHLORIDE 5 MG: 5 TABLET ORAL at 07:27

## 2017-06-02 RX ADMIN — CALCIUM CARBONATE 500 MG (1,250 MG)-VITAMIN D3 200 UNIT TABLET 1 TABLET: at 18:42

## 2017-06-02 RX ADMIN — SODIUM CHLORIDE 500 ML: 900 INJECTION, SOLUTION INTRAVENOUS at 11:31

## 2017-06-02 RX ADMIN — PRIMIDONE 200 MG: 50 TABLET ORAL at 22:14

## 2017-06-02 RX ADMIN — ACETAMINOPHEN 650 MG: 325 TABLET, FILM COATED ORAL at 18:41

## 2017-06-02 RX ADMIN — ACETAMINOPHEN 650 MG: 325 TABLET, FILM COATED ORAL at 09:40

## 2017-06-02 RX ADMIN — DOCUSATE SODIUM AND SENNOSIDES 1 TABLET: 8.6; 5 TABLET, FILM COATED ORAL at 09:41

## 2017-06-02 RX ADMIN — ESCITALOPRAM OXALATE 20 MG: 10 TABLET ORAL at 09:41

## 2017-06-02 RX ADMIN — Medication 5 ML: at 06:00

## 2017-06-02 RX ADMIN — POLYETHYLENE GLYCOL 3350 17 G: 17 POWDER, FOR SOLUTION ORAL at 09:39

## 2017-06-02 RX ADMIN — THERA TABS 1 TABLET: TAB at 09:41

## 2017-06-02 RX ADMIN — CALCIUM CARBONATE 500 MG (1,250 MG)-VITAMIN D3 200 UNIT TABLET 1 TABLET: at 09:40

## 2017-06-02 RX ADMIN — CLONAZEPAM 1 MG: 1 TABLET ORAL at 22:14

## 2017-06-02 RX ADMIN — DOCUSATE SODIUM AND SENNOSIDES 1 TABLET: 8.6; 5 TABLET, FILM COATED ORAL at 18:37

## 2017-06-02 RX ADMIN — CLONAZEPAM 1 MG: 1 TABLET ORAL at 09:40

## 2017-06-02 RX ADMIN — Medication 10 ML: at 18:42

## 2017-06-02 RX ADMIN — Medication 10 ML: at 22:00

## 2017-06-02 RX ADMIN — ACETAMINOPHEN 650 MG: 325 TABLET, FILM COATED ORAL at 22:14

## 2017-06-02 RX ADMIN — ACETAMINOPHEN 650 MG: 325 TABLET, FILM COATED ORAL at 02:51

## 2017-06-02 RX ADMIN — MELOXICAM 15 MG: 7.5 TABLET ORAL at 09:41

## 2017-06-02 RX ADMIN — ENOXAPARIN SODIUM 40 MG: 40 INJECTION SUBCUTANEOUS at 09:40

## 2017-06-02 RX ADMIN — MIDODRINE HYDROCHLORIDE 10 MG: 5 TABLET ORAL at 09:41

## 2017-06-02 RX ADMIN — FLUDROCORTISONE ACETATE 100 MCG: 0.1 TABLET ORAL at 09:41

## 2017-06-02 RX ADMIN — Medication 10 ML: at 18:43

## 2017-06-02 RX ADMIN — OXYCODONE HYDROCHLORIDE 5 MG: 5 TABLET ORAL at 02:51

## 2017-06-02 RX ADMIN — MIDODRINE HYDROCHLORIDE 10 MG: 5 TABLET ORAL at 18:41

## 2017-06-02 RX ADMIN — CALCIUM CARBONATE 500 MG (1,250 MG)-VITAMIN D3 200 UNIT TABLET 1 TABLET: at 12:24

## 2017-06-02 RX ADMIN — MIDODRINE HYDROCHLORIDE 10 MG: 5 TABLET ORAL at 12:24

## 2017-06-02 NOTE — PROGRESS NOTES
Problem: Mobility Impaired (Adult and Pediatric)  Goal: *Acute Goals and Plan of Care (Insert Text)  Physical Therapy Goals  Initiated 6/1/2017    1. Patient will move from supine to sit and sit to supine in bed with modified independence within 4 days. 2. Patient will perform sit to stand with modified independence within 4 days. 3. Patient will ambulate with modified independence for 200 feet with the least restrictive device within 4 days. 4. Patient will verbalize and demonstrate understanding of hip precautions per protocol within 4 days. 5. Patient will perform hip home exercise program per protocol with independence within 4 days. PHYSICAL THERAPY TREATMENT  Patient: Gayl Sprain (34 y.o. female)  Date: 6/2/2017  Diagnosis: UNKNOWN  Hip fracture (HCC)  Displaced fracture of right femoral neck, closed, initial encounter  Hip fracture ([facility] [address]  Displaced fracture of right femoral neck, closed, initial encounter Displaced fracture of right femoral neck (HCC)  Procedure(s) (LRB):  HIP HEMIARTHROPLASTY- RIGHT/ IP/ REQ. TF (Right) 2 Days Post-Op  Precautions: Fall, WBAT  ASSESSMENT:  Pt received resting in bed and agreeable to attempt OOB activity for the PM. BP assessed for orthostatics due to incident this AM as pt with syncopal episode. BP WNL with supine and sitting, however noted to drop with standing. Encouraged to march in place and take side steps to attempt to increase BP, but without success. Pt returned to bed and encouraged to work on hip exercises for strengthening. She may benefit from use of CORINNA's to maintain BP with positional change. RN notified and will speak with MD. PT will continue to follow for progression with OOB activity as she can tolerate. Recommend discharge to rehab as she is not safe for return to her [facility].    Progression toward goals:  [ ]      Improving appropriately and progressing toward goals  [X]      Improving slowly and progressing toward goals  [ ]      Not making progress toward goals and plan of care will be adjusted       PLAN:  Patient continues to benefit from skilled intervention to address the above impairments. Continue treatment per established plan of care. Discharge Recommendations:  Rehab  Further Equipment Recommendations for Discharge:  tbd       SUBJECTIVE:   I had my BP drop another time      OBJECTIVE DATA SUMMARY:   Functional Mobility Training:  Bed Mobility:     Supine to Sit: Supervision  Sit to Supine: Supervision           Transfers:  Sit to Stand: Contact guard assistance  Stand to Sit: Minimum assistance                 Therapeutic Exercises:   Exercises performed per protocol. See morning treatment note for description. Pain:  Pain Scale 1: Numeric (0 - 10)  Pain Intensity 1: 0           Pain Intervention(s) 1: Medication (see MAR)  Activity Tolerance:   No apparent distress   Please refer to the flowsheet for vital signs taken during this treatment.   After treatment:   [ ] Patient left in no apparent distress sitting up in chair  [X] Patient left in no apparent distress in bed  [X] Call bell left within reach  [X] Nursing notified  [ ] Caregiver present  [ ] Bed alarm activated      COMMUNICATION/COLLABORATION:   The patients plan of care was discussed with: Registered Nurse     Isaiah Feliciano, PT   Time Calculation: 17 mins

## 2017-06-02 NOTE — PROGRESS NOTES
Occupational Therapy Note:     Attempted to see pt for progression of ADl activity but she is unable to sit EOB or in chair due to orthostatic hypotension. Recommend follow up later as able.         Dino Esparza, OT

## 2017-06-02 NOTE — PROGRESS NOTES
Ortho Progress Note  2 Days Post-Op  Procedure(s):  HIP HEMIARTHROPLASTY- RIGHT/ IP/ REQ. TF    Subjective: No acute events overnight. Pt states she is doing well this am. Her pain is much improved from yesterday. She was able to work with PT yesterday and is looking forward to working with them today. She denies N/V, lightheadedness, dizziness, SOB, or abdominal pain. Physical Exam:   Visit Vitals    /68    Pulse 70    Temp 98.3 °F (36.8 °C)    Resp 16    Ht 5' 10\" (1.778 m)    Wt 60.8 kg (134 lb)    SpO2 96%    BMI 19.23 kg/m2     General appearance: alert, cooperative, no distress, appears stated age  Abdomen: soft, non-tender.  Bowel sounds normal. No masses, no organomegaly  Extremities: Homans sign is negative, no sign of DVT, passive ROM of R hip without pain, no pain w/ palpation R thigh, no swelling noted, no calf pain or swelling  Pulses: 2+ and symmetric  Wound: aquacel dressing c/d/i, no evidence of drainage  Neurologic: Grossly normal, ankle dorsi/plantar flexion intact, heel raise intact    Recent Results (from the past 24 hour(s))   HEMOGLOBIN    Collection Time: 06/02/17  2:58 AM   Result Value Ref Range    HGB 9.9 (L) 11.5 - 16.0 g/dL       Assessment:  Stable Post Op    Plan:  Lovenox 40mg SQ x 10d  Physical Therapy: WBAT (pt amb 50' 6/1)  Discharge Planning: SNF at [facility] when able                                   F/u in office in 2 weeks  Pain control: tylenol, mobic  Acute blood loss anemia: hgb 9.9 (11.0 6/1), normal post op finding, pt asymptomatic, will continue to monitor

## 2017-06-02 NOTE — ROUTINE PROCESS
Bedside shift change report given to [address] [place] [address] (oncoming nurse) by Bravo Gottlieb RN(offgoing nurse). Report given with SBAR.

## 2017-06-02 NOTE — PROGRESS NOTES
Bedside and Verbal shift [facility] by Raj Sharma (offgoing nurse). Report included the following information SBAR, Kardex and MAR.

## 2017-06-02 NOTE — PROGRESS NOTES
Problem: Mobility Impaired (Adult and Pediatric)  Goal: *Acute Goals and Plan of Care (Insert Text)  Physical Therapy Goals  Initiated 6/1/2017    1. Patient will move from supine to sit and sit to supine in bed with modified independence within 4 days. 2. Patient will perform sit to stand with modified independence within 4 days. 3. Patient will ambulate with modified independence for 200 feet with the least restrictive device within 4 days. 4. Patient will verbalize and demonstrate understanding of hip precautions per protocol within 4 days. 5. Patient will perform hip home exercise program per protocol with independence within 4 days. PHYSICAL THERAPY TREATMENT  Patient: Cheryl Escalante (35 y.o. female)  Date: 6/2/2017  Diagnosis: UNKNOWN  Hip fracture (HCC)  Displaced fracture of right femoral neck, closed, initial encounter Displaced fracture of right femoral neck (HCC)  Procedure(s) (LRB):  HIP HEMIARTHROPLASTY- RIGHT/ IP/ REQ. TF (Right) 2 Days Post-Op  Precautions: Fall, WBAT      ASSESSMENT:  Pt received resting in bed and agreeable to progress with her ambulation. She is requiring min A to exit the bed due to difficulty moving surgical leg. Min A to stand to walker and she was able to ambulate 50 ft with CGA/min A. During turn in the hallway pt noted to begin to lean forward and unable to recover her balance deficits. Rehab Tech obtained chair and brought up behind pt as she was requiring total A to remain on her feet. Pt safely placed in chair and returned to room for a T transfer x 3 back to bed. Rapid response called as pt not responding once placed in the chair with apparent syncopal episode and diaphoretic. Once back in bed pt reports feeling better and responding, AOx4. RRT assessing and checked out with no deficits. Recommend orthostatics with next session. Pt lives alone at an [facility] and do not feel she is safe for return at this time. Recommend rehab when medically stable.   Progression toward goals:  [ ]      Improving appropriately and progressing toward goals  [X]      Improving slowly and progressing toward goals  [ ]      Not making progress toward goals and plan of care will be adjusted       PLAN:  Patient continues to benefit from skilled intervention to address the above impairments. Continue treatment per established plan of care. Discharge Recommendations:  Rehab  Further Equipment Recommendations for Discharge:  tbd       SUBJECTIVE:   Patient stated I don't know why I am leaning forward.       OBJECTIVE DATA SUMMARY:   Critical Behavior:  Neurologic State: Alert  Orientation Level: Oriented X4  Cognition: Appropriate decision making, Appropriate for age attention/concentration, Appropriate safety awareness  Safety/Judgement: Good awareness of safety precautions  Functional Mobility Training:  Bed Mobility:     Supine to Sit: Minimum assistance  Sit to Supine: Total assistance; Other (comment) (x3 - T-transfer as pt with syncopal episode while walking)           Transfers:  Sit to Stand: Minimum assistance  Stand to Sit: Total assistance (pt ambulating and required assist to control to chair )        Bed to Chair: Total assistance (T-Transfer for safety)                    Balance:  Sitting: Intact  Standing: Impaired  Standing - Static: Good  Standing - Dynamic : Fair  Ambulation/Gait Training:  Distance (ft): 50 Feet (ft)  Assistive Device: Walker, rolling;Gait belt  Ambulation - Level of Assistance: Contact guard assistance        Gait Abnormalities: Antalgic;Decreased step clearance  Right Side Weight Bearing: As tolerated     Base of Support: Narrowed  Stance: Right decreased  Speed/Elana: Slow  Step Length: Right shortened;Left shortened           Therapeutic Exercises:   SUPINE  EXERCISES   Sets   Reps   Active Active Assist   Passive Self ROM   Comments   Ankle Pumps 1 10 [X]                                        [ ]                                        [ ] [ ]                                            Quad Sets 1 10 [X]                                        [ ]                                        [ ]                                        [ ]                                            Heel Slides 1 10 [X]                                        [ ]                                        [ ]                                        [ ]                                            Hip Abduction 1 10 [X]                                        [ ]                                        [ ]                                        [ ]                                            Glut Sets 1 10 [X]                                        [ ]                                        [ ]                                        [ ]                                                  [ ]                                        [ ]                                        [ ]                                        [ ]                                                  [ ]                                        [ ]                                        [ ]                                        [ ]                                                Jordyn Freitas     [ ]                                        [ ]                                        [ ]                                        [ ]                                            Hip Abduction     [ ]                                        [ ]                                        [ ]                                        [ ]                                                  [ ]                                        [ ]                                        [ ]                                        [ ]                                                  [ ]                                        [ ] [ ]                                        [ ]                                               Pain:  Pain Scale 1: Numeric (0 - 10)  Pain Intensity 1: 3           Pain Intervention(s) 1: Medication (see MAR)  Activity Tolerance:   No apparent distress   Please refer to the flowsheet for vital signs taken during this treatment.   After treatment:   [ ] Patient left in no apparent distress sitting up in chair  [X] Patient left in no apparent distress in bed  [X] Call bell left within reach  [X] Nursing notified  [ ] Caregiver present  [ ] Bed alarm activated      COMMUNICATION/COLLABORATION:   The patients plan of care was discussed with: Registered Nurse     Raul Feliciano, PT   Time Calculation: 16 mins

## 2017-06-02 NOTE — PROGRESS NOTES
Hospitalist Progress Note      Hospital summary: 68 y.o lady with afib, orthostatic hypotension, who presented with a right hip fracture after a fall. Assessment/Plan:  Right displaced femoral neck fracture: s/p R hip hemiarthroplasty on 5/31  -pain control  -PT rec rehab    Orthostatic hypotension: (chronic) had a rapid response for orthostatic hypotension and light-headedness while working with PT today  -bolus 500 mL IV NS  -continue fludrocortisone and midodrine  -advised her to take it slow when changing posture    Abnormal UA: significant pyuria, not sure why this was sent. She denies any urinary symptoms, and culture is negative. Afib: s/p pacemaker currently paced    Anxiety:   -continue lexapro    Anemia: mild expected blood loss post-op; monitor    Code status: full  DVT prophylaxis: sq Lovenox  Disposition: rehab  ----------------------------------------------    CC: right hip pain    S: hip is painful at incision site, had RRT called while working with PT due to orthostatic hypotension, felt light-headed at the time. Denies any dyspnea, n/v/d. Review of Systems:  Pertinent items are noted in HPI.     O:  Visit Vitals    BP 92/54 (BP Patient Position: Standing)    Pulse 74    Temp 97.4 °F (36.3 °C)    Resp 16    Ht 5' 10\" (1.778 m)    Wt 60.8 kg (134 lb)    SpO2 98%    BMI 19.23 kg/m2     Vitals:    06/02/17 0852 06/02/17 0901 06/02/17 0907 06/02/17 0910   BP: 102/63 134/61 108/60 92/54   BP 1 Location:  Right arm     BP Patient Position:  Supine Sitting Standing   Pulse: 73 74     Resp: 16 16     Temp:  97.4 °F (36.3 °C)     SpO2: 95% 98%     Weight:       Height:           PHYSICAL EXAM:  Gen: NAD, non-toxic  HEENT: anicteric sclerae, normal conjunctiva  Neck: supple  Heart: RRR, no MRG, no JVD, no peripheral edema  Lungs: CTA b/l, non-labored respirations  Abd: soft, NT, ND, BS+  Extr: warm, non-edematous  Skin: dry, no rash  Neuro: CN grossly intact, normal speech, moves all extr  Psych: normal mood, appropriate affect      Intake/Output Summary (Last 24 hours) at 06/02/17 0943  Last data filed at 06/02/17 0921   Gross per 24 hour   Intake              840 ml   Output              500 ml   Net              340 ml        Recent labs & imaging reviewed:  Recent Labs      06/02/17   0258  06/01/17   0255  05/31/17   1544   WBC   --    --   4.0   HGB  9.9*  11.0*  12.0   HCT   --    --   36.3   PLT   --    --   242     Recent Labs      06/01/17   0255  05/31/17   1544   NA  138  142   K  3.7  3.5   CL  103  105   CO2  25  29   BUN  14  15   CREA  0.85  0.81   GLU  139*  88   CA  9.0  9.3   MG   --   2.3     Recent Labs      05/31/17   1544   SGOT  13*   ALT  17   AP  125*   TBILI  0.5   TP  7.6   ALB  3.6   GLOB  4.0     Recent Labs      05/31/17   1544   INR  1.1   PTP  11.5*        Med list reviewed  Current Facility-Administered Medications   Medication Dose Route Frequency    sodium chloride 0.9 % bolus infusion 500 mL  500 mL IntraVENous ONCE    clonazePAM (KlonoPIN) tablet 1 mg  1 mg Oral BID PRN    escitalopram oxalate (LEXAPRO) tablet 20 mg  20 mg Oral DAILY    fludrocortisone (FLORINEF) tablet 100 mcg  0.1 mg Oral DAILY    midodrine (PROAMITINE) tablet 10 mg  10 mg Oral TID WITH MEALS    therapeutic multivitamin (THERAGRAN) tablet 1 Tab  1 Tab Oral DAILY    primidone (MYSOLINE) tablet 200 mg  200 mg Oral QHS    sodium chloride (NS) flush 5-10 mL  5-10 mL IntraVENous Q8H    sodium chloride (NS) flush 5-10 mL  5-10 mL IntraVENous PRN    naloxone (NARCAN) injection 0.4 mg  0.4 mg IntraVENous PRN    sodium chloride (NS) flush 5-10 mL  5-10 mL IntraVENous Q8H    sodium chloride (NS) flush 5-10 mL  5-10 mL IntraVENous PRN    naloxone (NARCAN) injection 0.4 mg  0.4 mg IntraVENous PRN    calcium-vitamin D (OS-JOCELYN) 500 mg-200 unit tablet  1 Tab Oral TID WITH MEALS    senna-docusate (PERICOLACE) 8.6-50 mg per tablet 1 Tab  1 Tab Oral BID    polyethylene glycol (MIRALAX) packet 17 g  17 g Oral DAILY    bisacodyl (DULCOLAX) suppository 10 mg  10 mg Rectal DAILY PRN    acetaminophen (TYLENOL) tablet 650 mg  650 mg Oral Q6H    traMADol (ULTRAM) tablet 50 mg  50 mg Oral Q6H PRN    oxyCODONE IR (ROXICODONE) tablet 2.5 mg  2.5 mg Oral Q4H PRN    oxyCODONE IR (ROXICODONE) tablet 5 mg  5 mg Oral Q4H PRN    hydrOXYzine HCl (ATARAX) tablet 10 mg  10 mg Oral Q8H PRN    ondansetron (ZOFRAN ODT) tablet 4 mg  4 mg Oral Q4H PRN    enoxaparin (LOVENOX) injection 40 mg  40 mg SubCUTAneous DAILY    meloxicam (MOBIC) tablet 15 mg  15 mg Oral DAILY       Care Plan discussed with:  Patient/Family    Melissa Sharma MD  Internal Medicine  Date of Service: 6/2/2017

## 2017-06-02 NOTE — PROGRESS NOTES
Spiritual Care Assessment/Progress Notes    Lilbourn Deal [id number]  [id number]    1940  68 y.o.  female    Patient Telephone Number: [phone] (home)   Congregational Affiliation: Restorationism   Language: English   Extended Emergency Contact Information  Primary Emergency Contact: Sixto Zarate Phone: [phone]  Relation: Child  Secondary Emergency Contact: Félix Zaldivar Phone: [phone]  Relation: Child   Patient Active Problem List    Diagnosis Date Noted    Hip fracture ([facility] [address] 05/31/2017    Displaced fracture of right femoral neck ([facility] [address] 05/31/2017    Atrial fibrillation ([facility] [address] 11/21/2016    SBO (small bowel obstruction) ([facility] [address] 11/14/2016    Colitis, Clostridium difficile 11/14/2016    Orthostatic hypertension 11/14/2016    Sacral decubitus ulcer 11/14/2016    Anxiety associated with depression 11/14/2016        Date: 6/2/2017       Level of Congregational/Spiritual Activity:  [x]         Involved in elaine tradition/spiritual practice    []         Not involved in elaine tradition/spiritual practice  []         Spiritually oriented    []         Claims no spiritual orientation    []         seeking spiritual identity  []         Feels alienated from Quaker practice/tradition  []         Feels angry about Quaker practice/tradition  []         Spirituality/Quaker tradition a resource for coping at this time.   []         Not able to assess due to medical condition    Services Provided Today:  []         crisis intervention    []         reading Scriptures  [x]         spiritual assessment    []         prayer  [x]         empathic listening/emotional support  []         rites and rituals (cite in comments)  []         life review     []         Quaker support  []         theological development   []         advocacy  []         ethical dialog     []         blessing  []         bereavement support    []         support to family  []         anticipatory grief support   []         help with AMD  []         spiritual guidance    []         meditation      Spiritual Care Needs  []         Emotional Support  []         Spiritual/Roman Catholic Care  []         Loss/Adjustment  []         Advocacy/Referral                /Ethics  [x]         No needs expressed at               this time  []         Other: (note in               comments)  [address]  []         Follow up visits with               pt/family  []         Provide materials  []         Schedule sacraments  []         Contact Community               Clergy  [x]         Follow up as needed  []         Other: (note in               comments)     Comments: I followed up with Ms. Patrice Hernández after things had settled following the rapid response. I provided a supportive and affirming presence as she shared with me about what led to the rapid response. She was in relatively good spirits but noted that she was in a lot of pain from her surgery. [place] appears to be an important component of her life. She belongs to [facility]. She and her  live at the [facility]. She wishes she could still be living at home but realized the necessity to receive more help, especially since her  has Parkinson's. She had no further spiritual care or emotional needs at the time and I informed her of our ongoing availability if needed. Rev. Memo Keith M.Div, [facility]

## 2017-06-02 NOTE — DISCHARGE INSTRUCTIONS
Discharge SNF/Rehab Instructions/LTAC       PATIENT ID: Sarah Macedo  MRN: [id number]   YOB: 1940    DATE OF ADMISSION: 5/31/2017  2:16 PM    DATE OF DISCHARGE: 6/5/2017    PRIMARY CARE PROVIDER: Arthur Gomez MD       ATTENDING PHYSICIAN: Jeremiah Ramirez MD  DISCHARGING PROVIDER: Jeremiah Ramirez MD     To contact this individual call [phone] and ask the  to page. If unavailable ask to be transferred the Adult Hospitalist Department. CONSULTATIONS: IP CONSULT TO HOSPITALIST    PROCEDURES/SURGERIES: Procedure(s):  HIP HEMIARTHROPLASTY- RIGHT/ IP/ REQ. TF    [address] COURSE:   68 y.o lady with afib, orthostatic hypotension, who presented with a right hip fracture after a fall.     Right displaced femoral neck fracture: s/p R hip hemiarthroplasty on 5/31     Orthostatic hypotension: (chronic) had a rapid response for orthostatic hypotension and light-headedness while working with PT on 6/2. Resolved after a 500 cc NS bolus. Continue fludrocortisone and midodrine.  Avoid rapid postural changes.     Afib: s/p pacemaker currently paced     Anxiety:   -continue lexapro     Anemia: mild expected blood loss post-op; hgb is now stable        DISCHARGE DIAGNOSES / PLAN:        PENDING TEST RESULTS:   At the time of discharge the following test results are still pending: n/a    FOLLOW UP APPOINTMENTS:    Follow-up Information     Follow up With Details Comments Contact Info    Ce Scott MD Schedule an appointment as soon as possible for a visit in 3 weeks  [address] [place] [address]  [phone]           ADDITIONAL CARE RECOMMENDATIONS: see recs from orthopedic surgery below      DISCHARGE MEDICATIONS:   See Medication Reconciliation Form      NOTIFY YOUR PHYSICIAN FOR ANY OF THE FOLLOWING:   Fever over 101 degrees for 24 hours.   Chest pain, shortness of breath, fever, chills, nausea, vomiting, diarrhea, change in mentation, falling, weakness, bleeding. Severe pain or pain not relieved by medications. Or, any other signs or symptoms that you may have questions about.     DISPOSITION:    Home With:   OT  PT  HH  RN      x SNF/Inpatient Rehab/LTAC    Independent/assisted living    Hospice    Other:       PATIENT CONDITION AT DISCHARGE:     Functional status    Poor    x Deconditioned     Independent      Cognition    x Lucid     Forgetful     Dementia      Catheters/lines (plus indication)    Toro     PICC     PEG    x None      Code status   x  Full code     DNR      PHYSICAL EXAMINATION AT DISCHARGE:   Refer to Progress Note      CHRONIC MEDICAL DIAGNOSES:  Problem List as of 6/5/2017  Date Reviewed: 5/31/2017          Codes Class Noted - Resolved    Atrial fibrillation ([facility] ICD-10-CM: I48.91  ICD-9-CM: 427.31  11/21/2016 - Present        SBO (small bowel obstruction) ([facility] ICD-10-CM: K56.69  ICD-9-CM: 560.9  11/14/2016 - Present        Colitis, Clostridium difficile ICD-10-CM: A04.7  ICD-9-CM: 008.45  11/14/2016 - Present        Orthostatic hypertension ICD-10-CM: I10  ICD-9-CM: 401.9  11/14/2016 - Present        Sacral decubitus ulcer ICD-10-CM: L89.159  ICD-9-CM: 707.03, 707.20  11/14/2016 - Present        Anxiety associated with depression ICD-10-CM: F41.8  ICD-9-CM: 300.4  11/14/2016 - Present        RESOLVED: Hip fracture ([facility] ICD-10-CM: S72.009A  ICD-9-CM: 820.8  5/31/2017 - 6/5/2017        * (Principal)RESOLVED: Displaced fracture of right femoral neck ([facility] ICD-10-CM: S72.001A  ICD-9-CM: 820.8  5/31/2017 - 6/5/2017                CDMP Checked:   Yes x     PROBLEM LIST Updated:  Yes x         Signed:   Luis Ruelas MD  6/5/2017  10:41 AM           Post op Discharge Instructions Hip Fracture   Bebeto Fisher MD  [facility]  [phone]    Patient Name: Mely Dave  Date of admission:  5/31/2017  Date of procedure: 5/31/2017   Procedure: Procedure(s):  HIP HEMIARTHROPLASTY- RIGHT  PCP: Phoenix Prasad MD  Date of discharge: 6/3/2017   Follow up office visit   See Dr. Danae Contreras approximately 3-4 weeks from date of surgery. Call [phone] to make an appointment. Activity  Walk with your walker with weight bearing restrictions as instructed by your physical therapist (partial weight bearing on your surgical leg). Continue using your walker until seen for follow-up visit. You should walk daily with the physical therapist  Perform your exercise routine 3 times a day as instructed by the physical therapist.    Incision Care  The Aquacel (brown, waterproof) surgical dressing is to remain on the hip for 7 days. On the 7th day gently peel the dressing off by carefully lifting the edge and stretching it slightly to break the adhesive seal    If your Aquacel dressing comes loose/off before the 7th day, you may replace it with a dry sterile gauze dressing; change it daily. Once the incision is not draining, leave it open to air    You may take a shower with the Aquacel dressing in place. Once the Aquacel is removed,  may shower and get your incision wet but do not submerge your incision under water in a bath tub, hot tub or swimming pool for 6 weeks after surgery.     Preventing blood clots  Lovenox injections 40mg sub q once daily until 14 days post-op    Pain management  Continue pain medication as prescribed in the hospital  Continue home medications per medication reconciliation  Place an ice bag on the hip for 15-20 minutes after exercising and as needed throughout the day and night    Diet  Resume usual diet; encourage fluids; provide foods high in fiber, calcium and vitamin D  Provide stool softeners/laxatives as needed        After [address] for SNF/Rehab (to be followed by post-acute provider)    Nursing  Complete head to toe assessment, vital signs  Medication reconciliation  Review pain management  Manage chronic medical conditions  Remove Aquacel dressing 7 days after surgery    Physical Therapy-status at discharge from the hospital    Weight bearing status:  Precautions at Admission: Fall, WBAT     Right Side Weight Bearing: As tolerated    Mobility Status:  Supine to Sit: Supervision  Sit to Stand: Contact guard assistance  Sit to Supine: Minimum assistance  Bed to Chair: Contact guard assistance    Gait:  Distance (ft): 110 Feet (ft)  Ambulation - Level of Assistance: Contact guard assistance  Assistive Device: Gait belt, Walker, rolling  Gait Abnormalities: Antalgic, Decreased step clearance    ADL status overall composite:     Dressing Assistance: Minimum assistance     Toilet Transfer : Minimum assistance    Physical Therapy-exercises, transfers, gait-training (partial weight bearing on the surgical leg)    Exercises related to strengthening the surgical hip:  Supine Exercises: Ankle pumps  Quad Sets  Gluteal Sets  Hip Abduction slides supine  Hip external rotation  Heel Slides    Standing Exercises:  Heel Raises  Mini-squats  Heel/toe touches and knee bend  Marching   Hip Abduction  Hip External Rotation  Hip Extension    Advance exercises with equipment for strengthening, flexibility, balance needs as appropriate per setting    Repetitions and number of sets to be established per patient tolerance     Reasons to call the Surgeon  1. Increased redness, swelling or drainage from the incision site  2. Temperature consistently greater than 101 degrees  3.   Increased pain or unrelieved pain in hip or calf        Blg-8/2014

## 2017-06-02 NOTE — PROGRESS NOTES
Spiritual Care Assessment/Progress Notes    Esmer Torres [id number]  [id number]    1940  68 y.o.  female    Patient Telephone Number: [phone] (home)   Hoahaoism Affiliation: Advent   Language: English   Extended Emergency Contact Information  Primary Emergency Contact: Sixto Zarate Phone: [phone]  Relation: Child  Secondary Emergency Contact: Félix Zaldivar Phone: [phone]  Relation: Child   Patient Active Problem List    Diagnosis Date Noted    Hip fracture ([facility] 05/31/2017    Displaced fracture of right femoral neck ([facility] 05/31/2017    Atrial fibrillation ([facility] 11/21/2016    SBO (small bowel obstruction) ([facility] 11/14/2016    Colitis, Clostridium difficile 11/14/2016    Orthostatic hypertension 11/14/2016    Sacral decubitus ulcer 11/14/2016    Anxiety associated with depression 11/14/2016        Date: 6/2/2017       Level of Hoahaoism/Spiritual Activity:  []         Involved in elaine tradition/spiritual practice    []         Not involved in elaine tradition/spiritual practice  []         Spiritually oriented    []         Claims no spiritual orientation    []         seeking spiritual identity  []         Feels alienated from Protestant practice/tradition  []         Feels angry about Protestant practice/tradition  []         Spirituality/Protestant tradition a resource for coping at this time.   [x]         Not able to assess due to medical condition    Services Provided Today:  []         crisis intervention    []         reading Scriptures  []         spiritual assessment    []         prayer  []         empathic listening/emotional support  []         rites and rituals (cite in comments)  []         life review     []         Protestant support  []         theological development   []         advocacy  []         ethical dialog     []         blessing  []         bereavement support    []         support to family  [] anticipatory grief support   []         help with AMD  []         spiritual guidance    []         meditation      Spiritual Care Needs  []         Emotional Support  []         Spiritual/Rastafari Care  []         Loss/Adjustment  []         Advocacy/Referral                /Ethics  []         No needs expressed at               this time  []         Other: (note in               comments)  [address]  []         Follow up visits with               pt/family  []         Provide materials  []         Schedule sacraments  []         Contact Community               Clergy  [x]         Follow up as needed  []         Other: (note in               comments)     Comments: I responded to the rapid response but the medical team was attending to her at the time. Spiritual Care Services will return at a more opportune time. Rev. Shiva Mar M.Div, [facility]

## 2017-06-03 LAB
ANION GAP BLD CALC-SCNC: 7 MMOL/L (ref 5–15)
BUN SERPL-MCNC: 17 MG/DL (ref 6–20)
BUN/CREAT SERPL: 24 (ref 12–20)
CALCIUM SERPL-MCNC: 9.3 MG/DL (ref 8.5–10.1)
CHLORIDE SERPL-SCNC: 105 MMOL/L (ref 97–108)
CO2 SERPL-SCNC: 29 MMOL/L (ref 21–32)
CREAT SERPL-MCNC: 0.72 MG/DL (ref 0.55–1.02)
ERYTHROCYTE [DISTWIDTH] IN BLOOD BY AUTOMATED COUNT: 15.2 % (ref 11.5–14.5)
GLUCOSE SERPL-MCNC: 107 MG/DL (ref 65–100)
HCT VFR BLD AUTO: 29.9 % (ref 35–47)
HGB BLD-MCNC: 9.7 G/DL (ref 11.5–16)
MAGNESIUM SERPL-MCNC: 2.1 MG/DL (ref 1.6–2.4)
MCH RBC QN AUTO: 29.7 PG (ref 26–34)
MCHC RBC AUTO-ENTMCNC: 32.4 G/DL (ref 30–36.5)
MCV RBC AUTO: 91.4 FL (ref 80–99)
PHOSPHATE SERPL-MCNC: 2.8 MG/DL (ref 2.6–4.7)
PLATELET # BLD AUTO: 178 K/UL (ref 150–400)
POTASSIUM SERPL-SCNC: 4.2 MMOL/L (ref 3.5–5.1)
RBC # BLD AUTO: 3.27 M/UL (ref 3.8–5.2)
SODIUM SERPL-SCNC: 141 MMOL/L (ref 136–145)
WBC # BLD AUTO: 5.6 K/UL (ref 3.6–11)

## 2017-06-03 PROCEDURE — 36415 COLL VENOUS BLD VENIPUNCTURE: CPT | Performed by: HOSPITALIST

## 2017-06-03 PROCEDURE — 97530 THERAPEUTIC ACTIVITIES: CPT

## 2017-06-03 PROCEDURE — 65660000000 HC RM CCU STEPDOWN

## 2017-06-03 PROCEDURE — 83735 ASSAY OF MAGNESIUM: CPT | Performed by: HOSPITALIST

## 2017-06-03 PROCEDURE — 97116 GAIT TRAINING THERAPY: CPT

## 2017-06-03 PROCEDURE — 74011250636 HC RX REV CODE- 250/636: Performed by: PHYSICIAN ASSISTANT

## 2017-06-03 PROCEDURE — 85027 COMPLETE CBC AUTOMATED: CPT | Performed by: HOSPITALIST

## 2017-06-03 PROCEDURE — 80048 BASIC METABOLIC PNL TOTAL CA: CPT | Performed by: HOSPITALIST

## 2017-06-03 PROCEDURE — 84100 ASSAY OF PHOSPHORUS: CPT | Performed by: HOSPITALIST

## 2017-06-03 PROCEDURE — 74011250637 HC RX REV CODE- 250/637: Performed by: FAMILY MEDICINE

## 2017-06-03 PROCEDURE — 74011250637 HC RX REV CODE- 250/637: Performed by: PHYSICIAN ASSISTANT

## 2017-06-03 PROCEDURE — 97110 THERAPEUTIC EXERCISES: CPT

## 2017-06-03 RX ORDER — FERROUS SULFATE, DRIED 160(50) MG
1 TABLET, EXTENDED RELEASE ORAL
Qty: 90 TAB | Refills: 0 | Status: SHIPPED | OUTPATIENT
Start: 2017-06-03

## 2017-06-03 RX ORDER — ENOXAPARIN SODIUM 100 MG/ML
40 INJECTION SUBCUTANEOUS DAILY
Qty: 4 ML | Refills: 0 | Status: SHIPPED | OUTPATIENT
Start: 2017-06-04 | End: 2017-06-14

## 2017-06-03 RX ORDER — AMOXICILLIN 250 MG
1 CAPSULE ORAL 2 TIMES DAILY
Qty: 60 TAB | Refills: 0 | Status: SHIPPED | OUTPATIENT
Start: 2017-06-03

## 2017-06-03 RX ORDER — OXYCODONE HYDROCHLORIDE 5 MG/1
2.5 TABLET ORAL
Qty: 30 TAB | Refills: 0 | Status: SHIPPED | OUTPATIENT
Start: 2017-06-03

## 2017-06-03 RX ORDER — TRAMADOL HYDROCHLORIDE 50 MG/1
50 TABLET ORAL
Qty: 30 TAB | Refills: 0 | Status: SHIPPED | OUTPATIENT
Start: 2017-06-03

## 2017-06-03 RX ADMIN — THERA TABS 1 TABLET: TAB at 09:33

## 2017-06-03 RX ADMIN — PRIMIDONE 200 MG: 50 TABLET ORAL at 22:50

## 2017-06-03 RX ADMIN — Medication 10 ML: at 14:28

## 2017-06-03 RX ADMIN — CALCIUM CARBONATE 500 MG (1,250 MG)-VITAMIN D3 200 UNIT TABLET 1 TABLET: at 12:13

## 2017-06-03 RX ADMIN — DOCUSATE SODIUM AND SENNOSIDES 1 TABLET: 8.6; 5 TABLET, FILM COATED ORAL at 09:33

## 2017-06-03 RX ADMIN — Medication 10 ML: at 06:00

## 2017-06-03 RX ADMIN — MELOXICAM 15 MG: 7.5 TABLET ORAL at 09:33

## 2017-06-03 RX ADMIN — Medication 10 ML: at 22:50

## 2017-06-03 RX ADMIN — CALCIUM CARBONATE 500 MG (1,250 MG)-VITAMIN D3 200 UNIT TABLET 1 TABLET: at 17:33

## 2017-06-03 RX ADMIN — FLUDROCORTISONE ACETATE 100 MCG: 0.1 TABLET ORAL at 09:33

## 2017-06-03 RX ADMIN — ACETAMINOPHEN 650 MG: 325 TABLET, FILM COATED ORAL at 20:37

## 2017-06-03 RX ADMIN — MIDODRINE HYDROCHLORIDE 10 MG: 5 TABLET ORAL at 09:35

## 2017-06-03 RX ADMIN — POLYETHYLENE GLYCOL 3350 17 G: 17 POWDER, FOR SOLUTION ORAL at 09:33

## 2017-06-03 RX ADMIN — ACETAMINOPHEN 650 MG: 325 TABLET, FILM COATED ORAL at 03:47

## 2017-06-03 RX ADMIN — ACETAMINOPHEN 650 MG: 325 TABLET, FILM COATED ORAL at 09:33

## 2017-06-03 RX ADMIN — CLONAZEPAM 1 MG: 1 TABLET ORAL at 22:49

## 2017-06-03 RX ADMIN — CALCIUM CARBONATE 500 MG (1,250 MG)-VITAMIN D3 200 UNIT TABLET 1 TABLET: at 09:35

## 2017-06-03 RX ADMIN — ENOXAPARIN SODIUM 40 MG: 40 INJECTION SUBCUTANEOUS at 09:27

## 2017-06-03 RX ADMIN — ESCITALOPRAM OXALATE 20 MG: 10 TABLET ORAL at 09:33

## 2017-06-03 RX ADMIN — MIDODRINE HYDROCHLORIDE 10 MG: 5 TABLET ORAL at 17:33

## 2017-06-03 RX ADMIN — ACETAMINOPHEN 650 MG: 325 TABLET, FILM COATED ORAL at 14:27

## 2017-06-03 RX ADMIN — MIDODRINE HYDROCHLORIDE 10 MG: 5 TABLET ORAL at 12:13

## 2017-06-03 NOTE — PROGRESS NOTES
Hospitalist Progress Note      Hospital summary: 68 y.o lady with afib, orthostatic hypotension, who presented with a right hip fracture after a fall. Assessment/Plan:  Right displaced femoral neck fracture: s/p R hip hemiarthroplasty on 5/31  -pain control  -PT rec rehab    Orthostatic hypotension: (chronic) had a rapid response for orthostatic hypotension and light-headedness while working with PT on 6/2  -continue fludrocortisone and midodrine  -symptoms resolved with a 500 cc saline bolus  -advised her to take it slow when changing posture    Abnormal UA: significant pyuria, not sure why this was sent. She denies any urinary symptoms, and culture is negative. Afib: s/p pacemaker currently paced    Anxiety:   -continue lexapro    Anemia: mild expected blood loss post-op; hgb is now stable    Code status: full  DVT prophylaxis: sq Lovenox  Disposition: medically stable for discharge she will be discharged as soon as rehab spot is confirmed, will f/u w/ CM today  ----------------------------------------------    CC: right hip pain    S: hip is painful at incision site but improving, no further dizziness/light-headedness. Denies any dyspnea, n/v/d. Review of Systems:  Pertinent items are noted in HPI.     O:  Visit Vitals    /65 (BP 1 Location: Right arm, BP Patient Position: At rest)    Pulse 70    Temp 98 °F (36.7 °C)    Resp 15    Ht 5' 10\" (1.778 m)    Wt 60.8 kg (134 lb)    SpO2 95%    BMI 19.23 kg/m2     Vitals:    06/02/17 1342 06/02/17 1449 06/02/17 2124 06/03/17 0340   BP: 133/67 112/55 113/46 117/65   BP 1 Location: Right arm Right arm Right arm Right arm   BP Patient Position: Supine Supine At rest At rest   Pulse: 75 74 76 70   Resp:  15 18 15   Temp:  97.5 °F (36.4 °C) 99.5 °F (37.5 °C) 98 °F (36.7 °C)   SpO2:  96%  95%   Weight:       Height:           PHYSICAL EXAM:  Gen: NAD, non-toxic  HEENT: anicteric sclerae, normal conjunctiva  Neck: supple  Heart: RRR, no MRG, no JVD, no peripheral edema  Lungs: CTA b/l, non-labored respirations  Abd: soft, NT, ND, BS+  Extr: warm, non-edematous  Skin: dry, no rash  Neuro: CN grossly intact, normal speech, moves all extr  Psych: normal mood, appropriate affect      Intake/Output Summary (Last 24 hours) at 06/03/17 0923  Last data filed at 06/02/17 1900   Gross per 24 hour   Intake              760 ml   Output                0 ml   Net              760 ml        Recent labs & imaging reviewed:  Recent Labs      06/03/17   0352  06/02/17   0258   05/31/17   1544   WBC  5.6   --    --   4.0   HGB  9.7*  9.9*   < >  12.0   HCT  29.9*   --    --   36.3   PLT  178   --    --   242    < > = values in this interval not displayed.      Recent Labs      06/03/17   0352  06/01/17   0255  05/31/17   1544   NA  141  138  142   K  4.2  3.7  3.5   CL  105  103  105   CO2  29  25  29   BUN  17  14  15   CREA  0.72  0.85  0.81   GLU  107*  139*  88   CA  9.3  9.0  9.3   MG  2.1   --   2.3   PHOS  2.8   --    --      Recent Labs      05/31/17   1544   SGOT  13*   ALT  17   AP  125*   TBILI  0.5   TP  7.6   ALB  3.6   GLOB  4.0     Recent Labs      05/31/17   1544   INR  1.1   PTP  11.5*        Med list reviewed  Current Facility-Administered Medications   Medication Dose Route Frequency    clonazePAM (KlonoPIN) tablet 1 mg  1 mg Oral BID PRN    escitalopram oxalate (LEXAPRO) tablet 20 mg  20 mg Oral DAILY    fludrocortisone (FLORINEF) tablet 100 mcg  0.1 mg Oral DAILY    midodrine (PROAMITINE) tablet 10 mg  10 mg Oral TID WITH MEALS    therapeutic multivitamin (THERAGRAN) tablet 1 Tab  1 Tab Oral DAILY    primidone (MYSOLINE) tablet 200 mg  200 mg Oral QHS    sodium chloride (NS) flush 5-10 mL  5-10 mL IntraVENous Q8H    sodium chloride (NS) flush 5-10 mL  5-10 mL IntraVENous PRN    naloxone (NARCAN) injection 0.4 mg  0.4 mg IntraVENous PRN    sodium chloride (NS) flush 5-10 mL  5-10 mL IntraVENous Q8H    sodium chloride (NS) flush 5-10 mL  5-10 mL IntraVENous PRN    naloxone (NARCAN) injection 0.4 mg  0.4 mg IntraVENous PRN    calcium-vitamin D (OS-JOCELYN) 500 mg-200 unit tablet  1 Tab Oral TID WITH MEALS    senna-docusate (PERICOLACE) 8.6-50 mg per tablet 1 Tab  1 Tab Oral BID    polyethylene glycol (MIRALAX) packet 17 g  17 g Oral DAILY    bisacodyl (DULCOLAX) suppository 10 mg  10 mg Rectal DAILY PRN    acetaminophen (TYLENOL) tablet 650 mg  650 mg Oral Q6H    traMADol (ULTRAM) tablet 50 mg  50 mg Oral Q6H PRN    oxyCODONE IR (ROXICODONE) tablet 2.5 mg  2.5 mg Oral Q4H PRN    oxyCODONE IR (ROXICODONE) tablet 5 mg  5 mg Oral Q4H PRN    hydrOXYzine HCl (ATARAX) tablet 10 mg  10 mg Oral Q8H PRN    ondansetron (ZOFRAN ODT) tablet 4 mg  4 mg Oral Q4H PRN    enoxaparin (LOVENOX) injection 40 mg  40 mg SubCUTAneous DAILY    meloxicam (MOBIC) tablet 15 mg  15 mg Oral DAILY       Care Plan discussed with:  Patient/Family and Nurse    Sowmya Mosher MD  Internal Medicine  Date of Service: 6/3/2017

## 2017-06-03 NOTE — PROGRESS NOTES
Bedside and Verbal shift change report given to China Mcgee (oncoming nurse) by Chalino Méndez (offgoing nurse). Report included the following information SBAR, Kardex, Intake/Output, MAR and Recent Results.

## 2017-06-03 NOTE — ROUTINE PROCESS
Bedside and Verbal shift change report given to Ute Bhandari RN (oncoming nurse) by Priyanka Rincon RN (offgoing nurse). Report included the following information SBAR, Kardex, Intake/Output, MAR, Recent Results and Cardiac Rhythm A-fib.

## 2017-06-03 NOTE — PROGRESS NOTES
Ortho Daily Progress Note    6/3/2017  10:12 AM    POD:  3 Days Post-Op  S/P:  Procedure(s):  RIGHT HIP HEMIARTHROPLASTY-    Afebrile/VSS  Doing well without complaints of nausea  Pain well controlled  Calves soft/NTTP Bilaterally  Incision OK, no drainage or dehiscence. Dressing clean and dry  Moving lower extremities well. Thigh soft  Neurocirculatory exam intact and within normal range.     Lab Results   Component Value Date/Time    HGB 9.7 06/03/2017 03:52 AM    INR 1.1 05/31/2017 03:44 PM         PLAN:  DVT prophylaxis: Lovenox for 2 weeks post op  WBAT with PT-mobilization  Pain Control  Plan to D/C [facility] today      LUANN Lyles

## 2017-06-03 NOTE — PROGRESS NOTES
Bedside and Verbal shift change report given to brittnee (oncoming nurse) by Lizbeth Meyer (offgoing nurse). Report included the following information SBAR.

## 2017-06-03 NOTE — PROGRESS NOTES
Problem: Mobility Impaired (Adult and Pediatric)  Goal: *Acute Goals and Plan of Care (Insert Text)  Physical Therapy Goals  Initiated 6/1/2017    1. Patient will move from supine to sit and sit to supine in bed with modified independence within 4 days. 2. Patient will perform sit to stand with modified independence within 4 days. 3. Patient will ambulate with modified independence for 200 feet with the least restrictive device within 4 days. 4. Patient will verbalize and demonstrate understanding of hip precautions per protocol within 4 days. 5. Patient will perform hip home exercise program per protocol with independence within 4 days. PHYSICAL THERAPY TREATMENT  Patient: Dann Rico (89 y.o. female)  Date: 6/3/2017  Diagnosis: UNKNOWN  Hip fracture (HCC)  Displaced fracture of right femoral neck, closed, initial encounter  Hip fracture ([address]  Displaced fracture of right femoral neck, closed, initial encounter Displaced fracture of right femoral neck (HCC)  Procedure(s) (LRB):  HIP HEMIARTHROPLASTY- RIGHT/ IP/ REQ. TF (Right) 3 Days Post-Op  Precautions: Fall, WBAT      ASSESSMENT:  Pt cooperative with PT, cautious about having another syncope episode. Did well with therex protocol right LE and short distance gait to chair. Asymptomatic from hypotension perspective, BP increased in sitting and stable in standing at 125/76. Will focus on increasing gait this afternoon. Pt with essential and intention tremors, don't affect gait. Progression toward goals:  [X]      Improving appropriately and progressing toward goals  [ ]      Improving slowly and progressing toward goals  [ ]      Not making progress toward goals and plan of care will be adjusted       PLAN:  Patient continues to benefit from skilled intervention to address the above impairments. Continue treatment per established plan of care.   Discharge Recommendations:  Inpatient Rehab  Further Equipment Recommendations for Discharge:  TBD SUBJECTIVE:   Patient stated I feel good sitting up.       OBJECTIVE DATA SUMMARY:   Critical Behavior:  Neurologic State: Alert, Appropriate for age  Orientation Level: Oriented X4  Cognition: Follows commands  Safety/Judgement: Good awareness of safety precautions  Functional Mobility Training:  Bed Mobility:     Supine to Sit: Stand-by asssistance      Transfers:  Sit to Stand: Contact guard assistance  Stand to Sit: Contact guard assistance        Bed to Chair: Contact guard assistance         Balance:     Ambulation/Gait Training:  Distance (ft): 4 Feet (ft)  Assistive Device: Walker, rolling  Ambulation - Level of Assistance: Contact guard assistance   Gait Abnormalities: Antalgic;Decreased step clearance  Right Side Weight Bearing: As tolerated     Base of Support: Narrowed  Stance: Right decreased  Speed/Elana: Pace decreased (<100 feet/min)  Step Length: Left shortened;Right shortened               Therapeutic Exercises:   SUPINE  EXERCISES   Sets   Reps   Active Active Assist   Passive Self ROM   Comments   Ankle Pumps 1 10 [X]                                        [ ]                                        [ ]                                        [ ]                                            Quad Sets 1 10 [X]                                        [ ]                                        [ ]                                        [ ]                                            Heel Slides 1 8 [ ]                                        [X]                                        [ ]                                        [ ]                                            Hip Abduction 1 8 [ ]                                        [X]                                        [ ]                                        [ ]                                            Glut Sets 1 10 [X]                                        [ ]                                        [ ] [ ]                                                  [ ]                                        [ ]                                        [ ]                                        [ ]                                                  [ ]                                        [ ]                                        [ ]                                        [ ]                                                Torie Milling     [ ]                                        [ ]                                        [ ]                                        [ ]                                            Hip Abduction     [ ]                                        [ ]                                        [ ]                                        [ ]                                                  [ ]                                        [ ]                                        [ ]                                        [ ]                                                  [ ]                                        [ ]                                        [ ]                                        [ ]                                               Pain:  Pain Scale 1: Numeric (0 - 10)  Pain Intensity 1: 0  Pain Location 1: Hip  Pain Orientation 1: Right  Pain Description 1: Aching  Pain Intervention(s) 1: Repositioned  Activity Tolerance:   fair  Please refer to the flowsheet for vital signs taken during this treatment.   After treatment:   [X] Patient left in no apparent distress sitting up in chair  [ ] Patient left in no apparent distress in bed  [X] Call bell left within reach  [X] Nursing notified  [ ] Caregiver present  [ ] Bed alarm activated      COMMUNICATION/COLLABORATION:   The patients plan of care was discussed with: Registered Nurse     Laura Ashby PT   Time Calculation: 25 mins

## 2017-06-03 NOTE — PROGRESS NOTES
Problem: Mobility Impaired (Adult and Pediatric)  Goal: *Acute Goals and Plan of Care (Insert Text)  Physical Therapy Goals  Initiated 6/1/2017    1. Patient will move from supine to sit and sit to supine in bed with modified independence within 4 days. 2. Patient will perform sit to stand with modified independence within 4 days. 3. Patient will ambulate with modified independence for 200 feet with the least restrictive device within 4 days. 4. Patient will verbalize and demonstrate understanding of hip precautions per protocol within 4 days. 5. Patient will perform hip home exercise program per protocol with independence within 4 days. PHYSICAL THERAPY TREATMENT  Patient: Mike David (86 y.o. female)  Date: 6/3/2017  Diagnosis: UNKNOWN  Hip fracture (HCC)  Displaced fracture of right femoral neck, closed, initial encounter  Hip fracture ([facility] [address]  Displaced fracture of right femoral neck, closed, initial encounter Displaced fracture of right femoral neck (HCC)  Procedure(s) (LRB):  HIP HEMIARTHROPLASTY- RIGHT/ IP/ REQ. TF (Right) 3 Days Post-Op  Precautions: Fall, WBAT      ASSESSMENT:  Pt up in chair since a.m. PT session. Agreeable to ambulation. Assisted to [facility] and pt able to have BM; stands for cleanup without difficulty. Asymptomatic from hypotension perspective. Transferred to sit EOB for PT to rearrange room, then ambulated 75 feet with RW and CGA, significant increase from earlier in week. Verbal Cues for safe walker placement when changing directions and approaching chairs. Patient happy about progress. Will continue BID; d/c potentially scheduled for Monday. [facility]  did assessment today; pt will be able to tolerate inpt rehab.   Progression toward goals:  [X]      Improving appropriately and progressing toward goals  [ ]      Improving slowly and progressing toward goals  [ ]      Not making progress toward goals and plan of care will be adjusted       PLAN:  Patient continues to benefit from skilled intervention to address the above impairments. Continue treatment per established plan of care. Discharge Recommendations:  Inpatient Rehab vs SNF  Further Equipment Recommendations for Discharge:  TBD       SUBJECTIVE:   Patient stated I feel much stronger today.       OBJECTIVE DATA SUMMARY:   Critical Behavior:  Neurologic State: Alert, Appropriate for age  Orientation Level: Oriented X4  Cognition: Follows commands  Safety/Judgement: Good awareness of safety precautions  Functional Mobility Training:  Bed Mobility:     Supine to Sit: Stand-by asssistance  Sit to Supine: Minimum assistance      Transfers:  Sit to Stand: Stand-by asssistance  Stand to Sit: Stand-by asssistance        Bed to Chair: Contact guard assistance         Balance:     Ambulation/Gait Training:  Distance (ft): 75 Feet (ft)  Assistive Device: Gait belt;Walker, rolling  Ambulation - Level of Assistance: Contact guard assistance   Gait Abnormalities: Antalgic; Step to gait  Right Side Weight Bearing: As tolerated     Base of Support: Narrowed  Stance: Right decreased  Speed/Elana: Pace decreased (<100 feet/min)  Step Length: Left shortened              Verbal Cues for safe walker placement when changing directions and approaching chairs.                   Therapeutic Exercises:   SUPINE  EXERCISES   Sets   Reps   Active Active Assist   Passive Self ROM   Comments   Ankle Pumps     [ ]                                        [ ]                                        [ ]                                        [ ]                                            Quad Sets     [ ]                                        [ ]                                        [ ]                                        [ ]                                            Jose Christopher     [ ]                                        [ ]                                        [ ]                                        [ ] Hip Abduction     [ ]                                        [ ]                                        [ ]                                        [ ]                                            Glut Sets     [ ]                                        [ ]                                        [ ]                                        [ ]                                                  [ ]                                        [ ]                                        [ ]                                        [ ]                                                  [ ]                                        [ ]                                        [ ]                                        [ ]                                                Violet Him     [ ]                                        [ ]                                        [ ]                                        [ ]                                            Hip Abduction     [ ]                                        [ ]                                        [ ]                                        [ ]                                                  [ ]                                        [ ]                                        [ ]                                        [ ]                                                  [ ]                                        [ ]                                        [ ]                                        [ ]                                               Pain:  Pain Scale 1: Numeric (0 - 10)  Pain Intensity 1: 0  Pain Location 1: Hip  Pain Orientation 1: Right  Pain Description 1: Aching  Pain Intervention(s) 1: Repositioned  Activity Tolerance:   fair     After treatment:   [ ] Patient left in no apparent distress sitting up in chair  [X] Patient left in no apparent distress in bed  [X] Call bell left within reach  [X] Nursing notified  [X] Caregiver present  [ ] Bed alarm activated      COMMUNICATION/COLLABORATION:   The patients plan of care was discussed with: Registered Nurse     Patrick Yo PT   Time Calculation: 20 mins

## 2017-06-03 NOTE — PROGRESS NOTES
Cm received a page from the staff nurse that the patient is ready for discharge today to [address]. Cm contacted the patients daughter, Eliseo Owen (P: [phone]) and the pt's daughter stated that the patient and daughter would like a referral sent to [address] [place] [address]. Cm met with the patient and she would also like a referral sent to [facility]. The patient has [facility] which requires authorization on Monday. Oniel also informed the daughter and patient that if she isn't accepted to [facility] does she have a SNF in mind and the patient stated if she doesn't get accepted at [facility] she would like a referral sent to [facility].

## 2017-06-04 PROCEDURE — 74011250636 HC RX REV CODE- 250/636: Performed by: PHYSICIAN ASSISTANT

## 2017-06-04 PROCEDURE — 65660000000 HC RM CCU STEPDOWN

## 2017-06-04 PROCEDURE — 74011250637 HC RX REV CODE- 250/637: Performed by: PHYSICIAN ASSISTANT

## 2017-06-04 PROCEDURE — 74011250637 HC RX REV CODE- 250/637: Performed by: FAMILY MEDICINE

## 2017-06-04 PROCEDURE — 97535 SELF CARE MNGMENT TRAINING: CPT

## 2017-06-04 PROCEDURE — 97116 GAIT TRAINING THERAPY: CPT

## 2017-06-04 RX ADMIN — ENOXAPARIN SODIUM 40 MG: 40 INJECTION SUBCUTANEOUS at 10:05

## 2017-06-04 RX ADMIN — ACETAMINOPHEN 650 MG: 325 TABLET, FILM COATED ORAL at 21:40

## 2017-06-04 RX ADMIN — DOCUSATE SODIUM AND SENNOSIDES 1 TABLET: 8.6; 5 TABLET, FILM COATED ORAL at 17:50

## 2017-06-04 RX ADMIN — ONDANSETRON 4 MG: 4 TABLET, ORALLY DISINTEGRATING ORAL at 11:19

## 2017-06-04 RX ADMIN — ACETAMINOPHEN 650 MG: 325 TABLET, FILM COATED ORAL at 10:04

## 2017-06-04 RX ADMIN — MIDODRINE HYDROCHLORIDE 10 MG: 5 TABLET ORAL at 10:05

## 2017-06-04 RX ADMIN — ACETAMINOPHEN 650 MG: 325 TABLET, FILM COATED ORAL at 14:54

## 2017-06-04 RX ADMIN — ACETAMINOPHEN 650 MG: 325 TABLET, FILM COATED ORAL at 02:19

## 2017-06-04 RX ADMIN — CLONAZEPAM 1 MG: 1 TABLET ORAL at 22:53

## 2017-06-04 RX ADMIN — PRIMIDONE 200 MG: 50 TABLET ORAL at 22:47

## 2017-06-04 RX ADMIN — Medication 10 ML: at 14:55

## 2017-06-04 RX ADMIN — Medication 10 ML: at 06:00

## 2017-06-04 RX ADMIN — MIDODRINE HYDROCHLORIDE 10 MG: 5 TABLET ORAL at 17:50

## 2017-06-04 RX ADMIN — FLUDROCORTISONE ACETATE 100 MCG: 0.1 TABLET ORAL at 10:05

## 2017-06-04 RX ADMIN — CALCIUM CARBONATE 500 MG (1,250 MG)-VITAMIN D3 200 UNIT TABLET 1 TABLET: at 17:50

## 2017-06-04 RX ADMIN — ONDANSETRON 4 MG: 4 TABLET, ORALLY DISINTEGRATING ORAL at 15:36

## 2017-06-04 RX ADMIN — ESCITALOPRAM OXALATE 20 MG: 10 TABLET ORAL at 10:05

## 2017-06-04 NOTE — ROUTINE PROCESS
Bedside and Verbal shift change report given to Iza Acuña (oncoming nurse) by Audelia Severin RN (offgoing nurse). Report included the following information SBAR, Kardex, Intake/Output, MAR and Recent Results.

## 2017-06-04 NOTE — PROGRESS NOTES
Ortho Daily Progress Note    6/4/2017  9:43 AM    POD:  4 Days Post-Op  S/P:  Procedure(s):  HIP HEMIARTHROPLASTY- RIGHT    Afebrile/VSS  Doing well without complaints of nausea  Pain well controlled  Calves soft/NTTP Bilaterally  Incision OK, no drainage or dehiscence. Dressing clean and dry  Moving lower extremities well. Neurocirculatory exam intact and within normal range. Lab Results   Component Value Date/Time    HGB 9.7 06/03/2017 03:52 AM    INR 1.1 05/31/2017 03:44 PM         PLAN: OOB to chair, incentive spirometry  DVT prophylaxis: Lovenox  WBAT with PT-mobilization  Pain Control  Plan to D/C Had been scheduled for DC yesterday to [facility]. Family has changed mind and requested [facility].  She has been accepted there but no transfer until tomorrow      LUANN Pena

## 2017-06-04 NOTE — PROGRESS NOTES
Problem: Mobility Impaired (Adult and Pediatric)  Goal: *Acute Goals and Plan of Care (Insert Text)  Physical Therapy Goals  Initiated 6/1/2017    1. Patient will move from supine to sit and sit to supine in bed with modified independence within 4 days. 2. Patient will perform sit to stand with modified independence within 4 days. 3. Patient will ambulate with modified independence for 200 feet with the least restrictive device within 4 days. 4. Patient will verbalize and demonstrate understanding of hip precautions per protocol within 4 days. 5. Patient will perform hip home exercise program per protocol with independence within 4 days. PHYSICAL THERAPY TREATMENT  Patient: Constantine Mortimer (40 y.o. female)  Date: 6/4/2017  Diagnosis: UNKNOWN  Hip fracture (HCC)  Displaced fracture of right femoral neck, closed, initial encounter  Hip fracture (HCC)  Displaced fracture of right femoral neck, closed, initial encounter Displaced fracture of right femoral neck (HCC)  Procedure(s) (LRB):  HIP HEMIARTHROPLASTY- RIGHT/ IP/ REQ. TF (Right) 4 Days Post-Op  Precautions: Fall, WBAT      ASSESSMENT:  Pt continues with dizziness and hypotension. BP improved slightly post seated UE & LE exercises. PT fatigues quickly and had to take multiple rest breaks to finish ambulating hallway back to her room, unsafe to ambulate alone. Progression toward goals:  [ ]    Improving appropriately and progressing toward goals  [X]    Improving slowly and progressing toward goals  [ ]    Not making progress toward goals and plan of care will be adjusted       PLAN:  Patient continues to benefit from skilled intervention to address the above impairments. Continue treatment per established plan of care. Discharge Recommendations:  Inpatient Rehab  Further Equipment Recommendations for Discharge:  TBD       SUBJECTIVE:   Patient stated I feel dizzy, my legs are tired.       OBJECTIVE DATA SUMMARY:   Critical Behavior:  Neurologic State: Alert  Orientation Level: Oriented X4  Cognition: Appropriate decision making, Appropriate for age attention/concentration, Appropriate safety awareness, Follows commands  Safety/Judgement: Good awareness of safety precautions  Functional Mobility Training:  Bed Mobility:     Supine to Sit: Supervision              Transfers:  Sit to Stand: Minimum assistance  Stand to Sit: Contact guard assistance                             Balance:  Sitting: Intact  Standing: Intact  Standing - Static: Occassional  Standing - Dynamic : Fair  Ambulation/Gait Training:  Distance (ft): 100 Feet (ft) (multiple standing rest breaks)  Assistive Device: Walker, rolling  Ambulation - Level of Assistance: Minimal assistance        Gait Abnormalities: Antalgic;Decreased step clearance (Left LE external rotation)  Right Side Weight Bearing: As tolerated     Base of Support: Narrowed     Speed/Elana: Pace decreased (<100 feet/min)  Step Length: Left shortened;Right shortened                 Therapeutic Exercises:   Performed B ankle pumps, fist pumping, LAQ, shoulder elevation 10 times each     Pain:  Pain Scale 1: Numeric (0 - 10)  Pain Intensity 1: 2  Pain Location 1: Hip  Pain Orientation 1: Right  Pain Description 1: Sore  Pain Intervention(s) 1: Repositioned  Activity Tolerance:   BP drops slightly in standing, improved with Therapeutic exercise  Please refer to the flowsheet for vital signs taken during this treatment.   After treatment:   [X]    Patient left in no apparent distress sitting up in chair  [ ]    Patient left in no apparent distress in bed  [X]    Call bell left within reach  [X]    Nursing notified  [ ]    Caregiver present  [ ]    Bed alarm activated      COMMUNICATION/COLLABORATION:   The patients plan of care was discussed with: Registered Nurse     Jj Donnelly, PT   Time Calculation: 22 mins

## 2017-06-04 NOTE — PROGRESS NOTES
Bedside and Verbal shift change report given to [facility] (oncoming nurse) by Willi Luis nurse). Report included the following information SBAR, Kardex, Intake/Output and MAR.

## 2017-06-04 NOTE — PROGRESS NOTES
Hospitalist Progress Note      Hospital summary: 68 y.o lady with afib, orthostatic hypotension, who presented with a right hip fracture after a fall. Assessment/Plan:  Right displaced femoral neck fracture: s/p R hip hemiarthroplasty on 5/31  -pain control  -PT rec rehab    Orthostatic hypotension: (chronic) had a rapid response for orthostatic hypotension and light-headedness while working with PT on 6/2  -continue fludrocortisone and midodrine  -symptoms resolved with a 500 cc saline bolus  -advised her to take it slow when changing posture    Abnormal UA: significant pyuria, not sure why this was sent. She denies any urinary symptoms, and culture is negative. Afib: s/p pacemaker currently paced    Anxiety:   -continue lexapro    Anemia: mild expected blood loss post-op; hgb is now stable    Code status: full  DVT prophylaxis: sq Lovenox  Disposition: medically stable for discharge; awaiting insurance auth for [facility]  ----------------------------------------------    CC: right hip pain    S: hip pain is controlled, no further dizziness/light-headedness. Denies any dyspnea, n/v/d. Review of Systems:  Pertinent items are noted in HPI.     O:  Visit Vitals    /68    Pulse 68    Temp 97.7 °F (36.5 °C)    Resp 16    Ht 5' 10\" (1.778 m)    Wt 60.8 kg (134 lb)    SpO2 96%    BMI 19.23 kg/m2     Vitals:    06/03/17 1604 06/03/17 2035 06/04/17 0217 06/04/17 0838   BP: 139/67 118/71 109/61 121/68   BP 1 Location: Left arm  Right arm    BP Patient Position: At rest  At rest    Pulse: 60 76 68 68   Resp: 16 16 16 16   Temp: 97 °F (36.1 °C) 98.2 °F (36.8 °C) 97.7 °F (36.5 °C)    SpO2: 99% 96% 95% 96%   Weight:       Height:           PHYSICAL EXAM:  Gen: NAD, non-toxic  HEENT: anicteric sclerae, normal conjunctiva  Neck: supple  Heart: RRR, no MRG, no JVD, no peripheral edema  Lungs: CTA b/l, non-labored respirations  Abd: soft, NT, ND, BS+  Extr: warm, non-edematous  Skin: dry, no rash  Neuro: CN grossly intact, normal speech, moves all extr, normal gait with walker  Psych: normal mood, appropriate affect    No intake or output data in the 24 hours ending 06/04/17 0943     Recent labs & imaging reviewed:  Recent Labs      06/03/17 0352 06/02/17 0258   WBC  5.6   --    HGB  9.7*  9.9*   HCT  29.9*   --    PLT  178   --      Recent Labs      06/03/17 0352   NA  141   K  4.2   CL  105   CO2  29   BUN  17   CREA  0.72   GLU  107*   CA  9.3   MG  2.1   PHOS  2.8     No results for input(s): SGOT, GPT, ALT, AP, TBIL, TBILI, TP, ALB, GLOB, GGT, AML, LPSE in the last 72 hours. No lab exists for component: AMYP, HLPSE  No results for input(s): INR, PTP, APTT in the last 72 hours.     No lab exists for component: INREXT, INREXT     Med list reviewed  Current Facility-Administered Medications   Medication Dose Route Frequency    clonazePAM (KlonoPIN) tablet 1 mg  1 mg Oral BID PRN    escitalopram oxalate (LEXAPRO) tablet 20 mg  20 mg Oral DAILY    fludrocortisone (FLORINEF) tablet 100 mcg  0.1 mg Oral DAILY    midodrine (PROAMITINE) tablet 10 mg  10 mg Oral TID WITH MEALS    therapeutic multivitamin (THERAGRAN) tablet 1 Tab  1 Tab Oral DAILY    primidone (MYSOLINE) tablet 200 mg  200 mg Oral QHS    sodium chloride (NS) flush 5-10 mL  5-10 mL IntraVENous Q8H    sodium chloride (NS) flush 5-10 mL  5-10 mL IntraVENous PRN    naloxone (NARCAN) injection 0.4 mg  0.4 mg IntraVENous PRN    sodium chloride (NS) flush 5-10 mL  5-10 mL IntraVENous Q8H    sodium chloride (NS) flush 5-10 mL  5-10 mL IntraVENous PRN    naloxone (NARCAN) injection 0.4 mg  0.4 mg IntraVENous PRN    calcium-vitamin D (OS-JOCELYN) 500 mg-200 unit tablet  1 Tab Oral TID WITH MEALS    senna-docusate (PERICOLACE) 8.6-50 mg per tablet 1 Tab  1 Tab Oral BID    polyethylene glycol (MIRALAX) packet 17 g  17 g Oral DAILY    bisacodyl (DULCOLAX) suppository 10 mg  10 mg Rectal DAILY PRN    acetaminophen (TYLENOL) tablet 650 mg  650 mg Oral Q6H    traMADol (ULTRAM) tablet 50 mg  50 mg Oral Q6H PRN    oxyCODONE IR (ROXICODONE) tablet 2.5 mg  2.5 mg Oral Q4H PRN    oxyCODONE IR (ROXICODONE) tablet 5 mg  5 mg Oral Q4H PRN    hydrOXYzine HCl (ATARAX) tablet 10 mg  10 mg Oral Q8H PRN    ondansetron (ZOFRAN ODT) tablet 4 mg  4 mg Oral Q4H PRN    enoxaparin (LOVENOX) injection 40 mg  40 mg SubCUTAneous DAILY    meloxicam (MOBIC) tablet 15 mg  15 mg Oral DAILY       Care Plan discussed with:  Patient/Family and Nurse    Anabel Kline MD  Internal Medicine  Date of Service: 6/4/2017

## 2017-06-04 NOTE — PROGRESS NOTES
Problem: Self Care Deficits Care Plan (Adult)  Goal: *Acute Goals and Plan of Care (Insert Text)  Occupational Therapy Goals  Initiated: 6/1/2017  1. Patient will perform grooming with supervision/set-up sitting in chair within 7 day(s). 2. Patient will perform bathing with min A from chair within 7 day(s). 3. Patient will perform upper body dressing min A within 7 day(s). 4. Patient will perform toilet transfers to [facility] with CGA within 7 day(s). 5. Patient will perform all aspects of toileting with CGA within 7 day(s). 6. Patient will perform lower body dressing with mod A within 7 days. OCCUPATIONAL THERAPY TREATMENT  Patient: Nickolas Barr (33 y.o. female)  Date: 6/4/2017  Diagnosis: UNKNOWN  Hip fracture (HCC)  Displaced fracture of right femoral neck, closed, initial encounter  Hip fracture ([facility] [address]  Displaced fracture of right femoral neck, closed, initial encounter Displaced fracture of right femoral neck (HCC)  Procedure(s) (LRB):  HIP HEMIARTHROPLASTY- RIGHT/ IP/ REQ. TF (Right) 4 Days Post-Op  Precautions: Fall, WBAT      ASSESSMENT:  Pt was able to complete lower body dressing with use of AE and did well with tasks overall at mod A. Recommend further AE training and education in preparation for independence with ADL activity. Pt was able to return to supine in bed following education and repositioned for comfort. Recommend discharge to rehab setting to maximize independence with self care and functional mobility tasks. Pt limited at this time due to fatigue and nausea. Pt receiving nausea medication. Progression toward goals:  [X]       Improving appropriately and progressing toward goals  [ ]       Improving slowly and progressing toward goals  [ ]       Not making progress toward goals and plan of care will be adjusted       PLAN:  Patient continues to benefit from skilled intervention to address the above impairments. Continue treatment per established plan of care.   Discharge Recommendations:  Rehab  Further Equipment Recommendations for Discharge:  TBD       SUBJECTIVE:   Patient stated I am feeling all right but I am tired.       OBJECTIVE DATA SUMMARY:   Cognitive/Behavioral Status:  Neurologic State: Alert  Orientation Level: Oriented X4  Cognition: Appropriate for age attention/concentration  Perception: Appears intact  Perseveration: No perseveration noted  Safety/Judgement: Good awareness of safety precautions     Functional Mobility and Transfers for ADLs:  Bed Mobility:  Supine to Sit:  (recieved sitting EOB)  Sit to Supine: Minimum assistance     Transfers:  Sit to Stand: Minimum assistance        Balance:  Sitting: Intact  Standing: Intact  Standing - Static: Occassional  Standing - Dynamic : Fair     ADL Intervention:   Pt introduced to AE training for the first time this date. Pt is able to cross LLE over right to access foot but unable to tolerate hip flexion to access right foot for dressing. Pt doffed sock with reacher and then donned sock with sock aid. Pt needing mod  Ab overall for all lower body dressing activities. Lower Body Dressing Assistance  Dressing Assistance: Moderate assistance  Underpants: Moderate assistance  Socks: Minimum assistance  Leg Crossed Method Used: No  Position Performed: Seated edge of bed  Cues: Verbal cues provided  Adaptive Equipment Used: Reacher;Sock aid           Cognitive Retraining  Safety/Judgement: Good awareness of safety precautions     Pain:  Pain Scale 1: Numeric (0 - 10)  Pain Intensity 1: 2  Pain Location 1: Hip  Pain Orientation 1: Right  Pain Description 1: Sore  Pain Intervention(s) 1: Repositioned  Activity Tolerance:   VSS throughout session.       After treatment:   [ ] Patient left in no apparent distress sitting up in chair  [X] Patient left in no apparent distress in bed  [X] Call bell left within reach  [X] Nursing notified  [ ] Caregiver present  [ ] Bed alarm activated      COMMUNICATION/COLLABORATION: The patients plan of care was discussed with: Physical Therapist and Registered Nurse     Beatrice Dickens OT  Time Calculation: 16 mins

## 2017-06-04 NOTE — PROGRESS NOTES
Problem: Mobility Impaired (Adult and Pediatric)  Goal: *Acute Goals and Plan of Care (Insert Text)  Physical Therapy Goals  Initiated 6/1/2017    1. Patient will move from supine to sit and sit to supine in bed with modified independence within 4 days. 2. Patient will perform sit to stand with modified independence within 4 days. 3. Patient will ambulate with modified independence for 200 feet with the least restrictive device within 4 days. 4. Patient will verbalize and demonstrate understanding of hip precautions per protocol within 4 days. 5. Patient will perform hip home exercise program per protocol with independence within 4 days. PHYSICAL THERAPY TREATMENT  Patient: Mike David (57 y.o. female)  Date: 6/4/2017  Diagnosis: UNKNOWN  Hip fracture (HCC)  Displaced fracture of right femoral neck, closed, initial encounter  Hip fracture ([facility] [address]  Displaced fracture of right femoral neck, closed, initial encounter Displaced fracture of right femoral neck (HCC)  Procedure(s) (LRB):  HIP HEMIARTHROPLASTY- RIGHT/ IP/ REQ. TF (Right) 4 Days Post-Op  Precautions: Fall, WBAT      ASSESSMENT:  Patient made great progress this afternoon, improved gait pattern, improved blood pressure, and able to ambulate the hallway without stopping. Progression toward goals:  [ ]    Improving appropriately and progressing toward goals  [X]    Improving slowly and progressing toward goals  [ ]    Not making progress toward goals and plan of care will be adjusted       PLAN:  Patient continues to benefit from skilled intervention to address the above impairments. Continue treatment per established plan of care. Discharge Recommendations:  Inpatient Rehab  Further Equipment Recommendations for Discharge:  TBD       SUBJECTIVE:   Patient stated I feel better.       OBJECTIVE DATA SUMMARY:   Critical Behavior:  Neurologic State: Alert  Orientation Level: Oriented X4  Cognition: Appropriate for age attention/concentration  Safety/Judgement: Good awareness of safety precautions  Functional Mobility Training:  Bed Mobility:     Supine to Sit: Supervision  Sit to Supine: Minimum assistance           Transfers:  Sit to Stand: Contact guard assistance  Stand to Sit: Contact guard assistance            Balance:  Sitting: Intact  Standing: Impaired  Standing - Static: Fair  Standing - Dynamic : Fair  Ambulation/Gait Training:  Distance (ft): 110 Feet (ft)  Assistive Device: Gait belt;Walker, rolling  Ambulation - Level of Assistance: Contact guard assistance        Gait Abnormalities: Antalgic;Decreased step clearance  Right Side Weight Bearing: As tolerated     Base of Support: Narrowed     Speed/Elana: Pace decreased (<100 feet/min)  Step Length: Right shortened;Left shortened        Therapeutic Exercises:   Seated B ankle pumps, LAQ, pillow adduction squeezes, and glut sets 20 reps  Pain:  Pain Scale 1: Numeric (0 - 10)  Pain Intensity 1: 2  Pain Location 1: Hip  Pain Orientation 1: Right  Pain Description 1: Dull  Pain Intervention(s) 1: Repositioned  Activity Tolerance:   Improved blood pressure and gait tolerance 2nd visit  Please refer to the flowsheet for vital signs taken during this treatment.   After treatment:   [X]    Patient left in no apparent distress sitting up in chair  [ ]    Patient left in no apparent distress in bed  [X]    Call bell left within reach  [X]    Nursing notified  [ ]    Caregiver present  [ ]    Bed alarm activated      COMMUNICATION/COLLABORATION:   The patients plan of care was discussed with: Registered Nurse     Martina Michel, PT   Time Calculation: 17 mins

## 2017-06-05 VITALS
OXYGEN SATURATION: 97 % | BODY MASS INDEX: 19.18 KG/M2 | RESPIRATION RATE: 16 BRPM | HEART RATE: 69 BPM | SYSTOLIC BLOOD PRESSURE: 133 MMHG | HEIGHT: 70 IN | DIASTOLIC BLOOD PRESSURE: 73 MMHG | WEIGHT: 134 LBS | TEMPERATURE: 98 F

## 2017-06-05 PROBLEM — S72.009A HIP FRACTURE (HCC): Status: RESOLVED | Noted: 2017-05-31 | Resolved: 2017-06-05

## 2017-06-05 PROBLEM — S72.001A DISPLACED FRACTURE OF RIGHT FEMORAL NECK (HCC): Status: RESOLVED | Noted: 2017-05-31 | Resolved: 2017-06-05

## 2017-06-05 PROCEDURE — 74011250637 HC RX REV CODE- 250/637: Performed by: FAMILY MEDICINE

## 2017-06-05 PROCEDURE — 97116 GAIT TRAINING THERAPY: CPT

## 2017-06-05 PROCEDURE — 74011250636 HC RX REV CODE- 250/636: Performed by: PHYSICIAN ASSISTANT

## 2017-06-05 PROCEDURE — 74011250637 HC RX REV CODE- 250/637: Performed by: PHYSICIAN ASSISTANT

## 2017-06-05 RX ADMIN — ENOXAPARIN SODIUM 40 MG: 40 INJECTION SUBCUTANEOUS at 09:26

## 2017-06-05 RX ADMIN — ESCITALOPRAM OXALATE 20 MG: 10 TABLET ORAL at 09:25

## 2017-06-05 RX ADMIN — CALCIUM CARBONATE 500 MG (1,250 MG)-VITAMIN D3 200 UNIT TABLET 1 TABLET: at 09:34

## 2017-06-05 RX ADMIN — MELOXICAM 15 MG: 7.5 TABLET ORAL at 09:33

## 2017-06-05 RX ADMIN — DOCUSATE SODIUM AND SENNOSIDES 1 TABLET: 8.6; 5 TABLET, FILM COATED ORAL at 09:25

## 2017-06-05 RX ADMIN — THERA TABS 1 TABLET: TAB at 09:25

## 2017-06-05 RX ADMIN — Medication 10 ML: at 07:20

## 2017-06-05 RX ADMIN — FLUDROCORTISONE ACETATE 100 MCG: 0.1 TABLET ORAL at 09:24

## 2017-06-05 RX ADMIN — ACETAMINOPHEN 650 MG: 325 TABLET, FILM COATED ORAL at 15:58

## 2017-06-05 RX ADMIN — POLYETHYLENE GLYCOL 3350 17 G: 17 POWDER, FOR SOLUTION ORAL at 09:24

## 2017-06-05 RX ADMIN — CALCIUM CARBONATE 500 MG (1,250 MG)-VITAMIN D3 200 UNIT TABLET 1 TABLET: at 12:00

## 2017-06-05 RX ADMIN — Medication 10 ML: at 14:00

## 2017-06-05 RX ADMIN — MIDODRINE HYDROCHLORIDE 10 MG: 5 TABLET ORAL at 09:33

## 2017-06-05 RX ADMIN — ACETAMINOPHEN 650 MG: 325 TABLET, FILM COATED ORAL at 07:19

## 2017-06-05 RX ADMIN — CLONAZEPAM 1 MG: 1 TABLET ORAL at 09:36

## 2017-06-05 NOTE — PROGRESS NOTES
Bedside and Verbal shift change report given to Neptali Erwin RN (oncoming nurse) by Lacie Still RN (offgoing nurse). Report included the following information SBAR, Kardex, OR Summary, Procedure Summary, Intake/Output, MAR and Recent Results.

## 2017-06-05 NOTE — PROGRESS NOTES
[address] FRACTURE PROGRESS NOTE    2017  Admit Date:   2017    Post Op day: 5 Days Post-Op    Subjective:    Caron Chung is feeling well overall. Still some right hip soreness and appropriate post-operative pain. States Tylenol is helping. No new issues. Awaiting placement at [facility].   Denies N/V/SOB or CP  Tolerating diet     PT/OT:   Gait:  Gait  Base of Support: Narrowed  Speed/Elana: Fluctuations  Step Length: Right shortened, Left shortened  Stance: Right decreased  Gait Abnormalities: Decreased step clearance  Ambulation - Level of Assistance: Contact guard assistance  Distance (ft): 75 Feet (ft)  Assistive Device: Walker, rolling, Gait belt                 Vital Signs:    Patient Vitals for the past 8 hrs:   BP Temp Pulse Resp SpO2   17 0942 136/70 - 78 - -   17 0941 134/71 - 81 - -   17 0920 140/61 97.8 °F (36.6 °C) 75 16 96 %     Temp (24hrs), Av.3 °F (36.8 °C), Min:97.8 °F (36.6 °C), Max:98.7 °F (37.1 °C)      Pain Control:   Pain Assessment  Pain Scale 1: Numeric (0 - 10)  Pain Intensity 1: 0  Pain Onset 1: post op  Pain Location 1: Hip  Pain Orientation 1: Right  Pain Description 1: Dull  Pain Intervention(s) 1: Repositioned    Meds:    Current Facility-Administered Medications   Medication Dose Route Frequency    clonazePAM (KlonoPIN) tablet 1 mg  1 mg Oral BID PRN    escitalopram oxalate (LEXAPRO) tablet 20 mg  20 mg Oral DAILY    fludrocortisone (FLORINEF) tablet 100 mcg  0.1 mg Oral DAILY    midodrine (PROAMITINE) tablet 10 mg  10 mg Oral TID WITH MEALS    therapeutic multivitamin (THERAGRAN) tablet 1 Tab  1 Tab Oral DAILY    primidone (MYSOLINE) tablet 200 mg  200 mg Oral QHS    sodium chloride (NS) flush 5-10 mL  5-10 mL IntraVENous Q8H    sodium chloride (NS) flush 5-10 mL  5-10 mL IntraVENous PRN    naloxone (NARCAN) injection 0.4 mg  0.4 mg IntraVENous PRN    sodium chloride (NS) flush 5-10 mL  5-10 mL IntraVENous Q8H    sodium chloride (NS) flush 5-10 mL  5-10 mL IntraVENous PRN    naloxone (NARCAN) injection 0.4 mg  0.4 mg IntraVENous PRN    calcium-vitamin D (OS-JOCELYN) 500 mg-200 unit tablet  1 Tab Oral TID WITH MEALS    senna-docusate (PERICOLACE) 8.6-50 mg per tablet 1 Tab  1 Tab Oral BID    polyethylene glycol (MIRALAX) packet 17 g  17 g Oral DAILY    bisacodyl (DULCOLAX) suppository 10 mg  10 mg Rectal DAILY PRN    acetaminophen (TYLENOL) tablet 650 mg  650 mg Oral Q6H    traMADol (ULTRAM) tablet 50 mg  50 mg Oral Q6H PRN    oxyCODONE IR (ROXICODONE) tablet 2.5 mg  2.5 mg Oral Q4H PRN    oxyCODONE IR (ROXICODONE) tablet 5 mg  5 mg Oral Q4H PRN    hydrOXYzine HCl (ATARAX) tablet 10 mg  10 mg Oral Q8H PRN    ondansetron (ZOFRAN ODT) tablet 4 mg  4 mg Oral Q4H PRN    enoxaparin (LOVENOX) injection 40 mg  40 mg SubCUTAneous DAILY    meloxicam (MOBIC) tablet 15 mg  15 mg Oral DAILY       LAB:    Recent Labs      06/03/17   0352   HCT  29.9*   HGB  9.7*       Transfuse PRBC's:      Assessment & Physician's Comment:  Dressing is clean, dry, and intact  Neurovascular checks within normal limits  Orientation:  Oriented    Principal Problem:    Displaced fracture of right femoral neck (HCC) (5/31/2017)    Active Problems:    Hip fracture (HCC) (5/31/2017)        Plan:    Cont Tylenol for pain management  Cont Lovenox for DVT prophylaxis  Cont PT/OT for mobilization  Medical management per primary team  Case management for disposition [address]?   Ready for discharge from [address] [place] [address] , [address] [facility]

## 2017-06-05 NOTE — DISCHARGE SUMMARY
Discharge Summary     Patient: Esmer Torres MRN: [id number]  SSN: [id number]    YOB: 1940  Age: 68 y.o. Sex: female       Admit Date: 5/31/2017    Discharge Date: 6/5/2017      Admission Diagnoses: right femoral neck fracture    Discharge Diagnoses:    Right displaced femoral neck fracture, s/p right hip hemiarthroplasty  Orthostatic hypotension  Atrial fibrillation  Anxiety    Discharge Condition: Stable    Hospital Course: 68 y.o lady with afib, orthostatic hypotension, who presented with a right hip fracture after a fall.     Right displaced femoral neck fracture: s/p R hip hemiarthroplasty on 5/31     Orthostatic hypotension: (chronic) had a rapid response for orthostatic hypotension and light-headedness while working with PT on 6/2. Resolved after a 500 cc NS bolus. Continue fludrocortisone and midodrine.  Avoid rapid postural changes.     Afib: s/p pacemaker currently paced     Anxiety:   -continue lexapro     Anemia: mild expected blood loss post-op; hgb is now stable      Consults: Orthopedics    Significant Diagnostic Studies: see above    Disposition: inpatient rehab    S: feels well today, pain is adequately controlled, no dizziness, no dyspnea, n/v/d.    O:   Visit Vitals    /70 (BP 1 Location: Left arm, BP Patient Position: Sitting)    Pulse 78    Temp 97.8 °F (36.6 °C)    Resp 16    Ht 5' 10\" (1.778 m)    Wt 60.8 kg (134 lb)    SpO2 96%    BMI 19.23 kg/m2     Gen: NAD, non-toxic  HEENT: anicteric sclerae, normal conjunctiva  Neck: supple  Heart: RRR, no MRG, no peripheral edema  Lungs: CTA b/l, non-labored respirations  Abd: soft, NT, ND, BS+  Extr: warm, non-edematous  Skin: dry, no rash  Neuro: CN grossly intact, normal speech, moves all extr  Psych: normal mood, appropriate affect      Discharge Medications:   Current Discharge Medication List      START taking these medications    Details   calcium-vitamin D (OYSTER SHELL) 500 mg(1,250mg) -200 unit per tablet Take 1 Tab by mouth three (3) times daily (with meals). Indications: OSTEOPOROSIS  Qty: 90 Tab, Refills: 0      enoxaparin (LOVENOX) 40 mg/0.4 mL 0.4 mL by SubCUTAneous route daily for 10 days. Indications: DEEP VEIN THROMBOSIS PREVENTION  Qty: 4 mL, Refills: 0      oxyCODONE IR (ROXICODONE) 5 mg immediate release tablet Take 0.5 Tabs by mouth every four (4) hours as needed. Max Daily Amount: 15 mg.  Qty: 30 Tab, Refills: 0      senna-docusate (PERICOLACE) 8.6-50 mg per tablet Take 1 Tab by mouth two (2) times a day. Indications: Constipation  Qty: 60 Tab, Refills: 0      traMADol (ULTRAM) 50 mg tablet Take 1 Tab by mouth every six (6) hours as needed. Max Daily Amount: 200 mg. Indications: Pain  Qty: 30 Tab, Refills: 0         CONTINUE these medications which have NOT CHANGED    Details   clonazePAM (KLONOPIN) 1 mg tablet Take 1 mg by mouth two (2) times a day. escitalopram oxalate (LEXAPRO) 20 mg tablet Take 20 mg by mouth daily. fludrocortisone (FLORINEF) 0.1 mg tablet Take 0.1 mg by mouth daily. midodrine (PROAMITINE) 10 mg tablet Take 10 mg by mouth three (3) times daily. primidone (MYSOLINE) 50 mg tablet Take 200 mg by mouth nightly. codeine-butalbital-acetaminophen-caffeine (FIORICET WITH CODEINE) -34-30 mg per capsule Take 1 Cap by mouth every four (4) hours as needed for Headache.      loratadine (CLARITIN) 10 mg tablet Take 10 mg by mouth daily as needed for Allergies. HYDROcodone-acetaminophen (NORCO) 5-325 mg per tablet Take 1 Tab by mouth every four (4) hours as needed for Pain.      multivitamin (ONE A DAY) tablet Take 1 Tab by mouth daily.              FOLLOW UP APPOINTMENTS:    Follow-up Information     Follow up With Details Comments Contact Info    Amadeo Rivera MD Schedule an appointment as soon as possible for a visit in 3 weeks  [address], [address]   [address] [facility] [address]  [phone]           Signed By: Sowmya Mosher MD     June 5, 2017      Greater than 30 minutes spent on discharge management.

## 2017-06-05 NOTE — PROGRESS NOTES
Hospital to SNF SBAR Handoff - Consuelo Yue                                                                        68 y.o.   female    [address]   Room: [address]  Unit Phone# :  [phone]      [facility]  [address] [address] [address] [facility] [address]  Dept: [address]: [phone]                    SITUATION     Admitted:  5/31/2017         Attending Provider:  Yessenia Pierre MD       Consultations:  IP CONSULT TO HOSPITALIST    PCP:  Meng Mccormick MD   [phone]    Treatment Team: Attending Provider: Yessenia Pierre MD; Consulting Provider: Arlyn Storey MD; Consulting Provider: Jenni Raman MD; Physician Assistant: LUANN Calhoun; Physician Assistant: LUANN Leahy; Utilization Review: Jimena Leggett RN; Care Manager: LOUISA Johnson    Admitting Dx:  Rojelio Coates  Hip fracture [facility])  Displaced fracture of right femoral neck, closed, initial encounter  Hip fracture [facility])  Displaced fracture of right femoral neck, closed, initial encounter       Principal Problem: Displaced fracture of right femoral neck (HCC)    5 Days Post-Op of   Procedure(s):  HIP HEMIARTHROPLASTY- RIGHT/ IP/ REQ.  TF   BY: Jenni Raman MD             ON: 5/31/2017                  Code Status: Full Code                Advance Directives:   Advance Care Planning 5/31/2017   Patient's Healthcare Decision Maker is: Legal Next of Jacey 69   Primary Decision Maker Name Sherry Mejia   Primary Decision Maker Phone Number [phone]   Confirm Advance Directive Yes, not on file    (Send w/patient)   Yes Not W Pt       Isolation:  There are currently no Active Isolations       MDRO: No current active infections    Pain Medications given:  Tylenol    Last dose: 6/5/2017 at  [address] [facility] [address] [facility] [address] needed: no  Type of equipment:      (Not currently on dialysis)  (Not currently on dialysis)  (Not currently on dialysis)     BACKGROUND Allergies:  No Known Allergies    Past Medical History:   Diagnosis Date    Arthritis     Liver disease     Other ill-defined conditions     GI PROBLEMS    Other ill-defined conditions     SARCOIDOSIS    Other ill-defined conditions     ANXIETY       Past Surgical History:   Procedure Laterality Date    HX CATARACT REMOVAL      AME. W/LENS IMPLANT    HX DILATION AND CURETTAGE      X2    HX ORTHOPAEDIC      RT HAND MILLDLE FINGER GANGLION CYST REMOVAL    HX OTHER SURGICAL      EYELID SURGERY FOR DROOPY EYE LIDS    HX TONSILLECTOMY         Prescriptions Prior to Admission   Medication Sig    clonazePAM (KLONOPIN) 1 mg tablet Take 1 mg by mouth two (2) times a day.  escitalopram oxalate (LEXAPRO) 20 mg tablet Take 20 mg by mouth daily.  fludrocortisone (FLORINEF) 0.1 mg tablet Take 0.1 mg by mouth daily.  midodrine (PROAMITINE) 10 mg tablet Take 10 mg by mouth three (3) times daily.  primidone (MYSOLINE) 50 mg tablet Take 200 mg by mouth nightly.  codeine-butalbital-acetaminophen-caffeine (FIORICET WITH CODEINE) -13-30 mg per capsule Take 1 Cap by mouth every four (4) hours as needed for Headache.  loratadine (CLARITIN) 10 mg tablet Take 10 mg by mouth daily as needed for Allergies.  HYDROcodone-acetaminophen (NORCO) 5-325 mg per tablet Take 1 Tab by mouth every four (4) hours as needed for Pain.  multivitamin (ONE A DAY) tablet Take 1 Tab by mouth daily. Hard scripts included in transfer packet yes    Vaccinations: There is no immunization history on file for this patient. Readmission Risks:    Known Risks: RRAT 16        The Charlson CoMorbitiy Index tool is an evidenced based tool that has more automatic generated information. The tool looks at many different items such as the age of the patient, how many times they were admitted in the last calendar year, current length of stay in the hospital and their diagnosis.  All of these items are pulled automatically from information documented in the chart from various places and will generate a score that predicts whether a patient is at low (less than 13), medium (13-20) or high (21 or greater) risk of being readmitted. ASSESSMENT                Temp: 98 °F (36.7 °C) (06/05/17 1413) Pulse (Heart Rate): 69 (06/05/17 1413)     Resp Rate: 16 (06/05/17 1413)           BP: 133/73 (06/05/17 1413)     O2 Sat (%): 97 % (06/05/17 1413)     Weight: 60.8 kg (134 lb)    Height: 5' 10\" (177.8 cm) (05/31/17 1356)       If above not within 1 hour of discharge:    BP:_____  P:____  R:____ T:_____ O2 Sat: ___%  O2: ______    Active Orders   Diet    DIET REGULAR         Orientation: oriented to time, place, person and situation     Active Behaviors: None                                   Active Lines/Drains:  (Peg Tube / Toro / CL or S/L?): no    Urinary Status: Voiding     Last BM: Last Bowel Movement Date: 06/03/17     Skin Integrity: Incision (comment)   Wound Hip Right-DRESSING STATUS: Clean, dry, and intact    Wound Hip Right-DRESSING TYPE: [employer]    Mobility: Slightly limited   Weight Bearing Status: WBAT (Weight Bearing as Tolerated)      Gait Training  Assistive Device: Walker, rolling, Gait belt  Ambulation - Level of Assistance: Contact guard assistance  Distance (ft): 75 Feet (ft)         Lab Results   Component Value Date/Time    Glucose 107 06/03/2017 03:52 AM    INR 1.1 05/31/2017 03:44 PM    HGB 9.7 06/03/2017 03:52 AM    HGB 9.9 06/02/2017 02:58 AM        RECOMMENDATION     See After Visit Summary (AVS) for:  · Discharge instructions  · After [address]   · Special equipment needed (entered pre-discharge by Care Management)  · Medication Reconciliation    · Follow up Appointment(s)         Report given/sent by:   Tavares Lee RN                    Verbal report given to: [facility] Nurse  FAXED to:           Estimated discharge time:  6/5/2017 at 4pm

## 2017-06-05 NOTE — PROGRESS NOTES
[facility] has obtained the authorization for admission. The patient's daughter will transport at 3:30pm. A private room will be available after 4pm. Kardex, mars, discharge orders, EMTALA, and ambulance PCS will follow.  BABITA

## 2017-06-05 NOTE — PROGRESS NOTES
Problem: Mobility Impaired (Adult and Pediatric)  Goal: *Acute Goals and Plan of Care (Insert Text)  Physical Therapy Goals  Updated 6/5/2017  1. Patient will move from supine to sit and sit to supine in bed with modified independence within 4 days. 2. Patient will perform sit to stand with modified independence within 4 days. 3. Patient will ambulate with modified independence for 150 feet with the least restrictive device within 4 days. 4. Patient will verbalize and demonstrate understanding of hip precautions per protocol within 4 days. 5. Patient will perform hip home exercise program per protocol with independence within 4 days. Initiated 6/1/2017  1. Patient will move from supine to sit and sit to supine in bed with modified independence within 4 days. 2. Patient will perform sit to stand with modified independence within 4 days. 3. Patient will ambulate with modified independence for 200 feet with the least restrictive device within 4 days. 4. Patient will verbalize and demonstrate understanding of hip precautions per protocol within 4 days. 5. Patient will perform hip home exercise program per protocol with independence within 4 days. PHYSICAL THERAPY TREATMENT: WEEKLY REASSESSMENT  Patient: Sarah Macedo (47 y.o. female)  Date: 6/5/2017  Diagnosis: UNKNOWN  Hip fracture (HCC)  Displaced fracture of right femoral neck, closed, initial encounter  Hip fracture ([facility] [address]  Displaced fracture of right femoral neck, closed, initial encounter Displaced fracture of right femoral neck (HCC)  Procedure(s) (LRB):  HIP HEMIARTHROPLASTY- RIGHT/ IP/ REQ. TF (Right) 5 Days Post-Op  Precautions: Fall, WBAT      ASSESSMENT:  Pt received resting in bed and eager to work with therapy as she is highly motivated to return to her prior level of independence. She was able to ambulate a total of 75 ft with RW, CGA for safety.  Pt noted to have decreased endurance compared to her baseline and requires frequent rest breaks. She performed Timed Up and Go Test with a score of 41.5 seconds indicating increased fall risk. Pt would greatly benefit from rehab prior to returning to her DARRELL. Pt would be able to tolerate the 3 hours of daily therapy required and is awaiting discharge to [facility] if possible. Patient's progression toward goals since last assessment: Pt progressing well with therapy. Has been limited at times due to orthostatic hypotension and decreased endurance, but overall doing well. PLAN:  Goals have been updated based on progression since last assessment. Patient continues to benefit from skilled intervention to address the above impairments. Continue to follow the patient daily to address goals. Planned Interventions:  [X]              Bed Mobility Training             [ ]       Neuromuscular Re-Education  [X]              Transfer Training                   [ ]       Orthotic/Prosthetic Training  [X]              Gait Training                         [ ]       Modalities  [X]              Therapeutic Exercises           [ ]       Edema Management/Control  [X]              Therapeutic Activities            [X]       Patient and Family Training/Education  [ ]              Other (comment):  Discharge Recommendations: Inpatient Rehab  Further Equipment Recommendations for Discharge: tbd       SUBJECTIVE:   Patient stated Patria Snyderon want to walk.       OBJECTIVE DATA SUMMARY:   Critical Behavior:  Neurologic State: Alert  Orientation Level: Oriented X4  Cognition: Appropriate decision making, Appropriate for age attention/concentration, Appropriate safety awareness, Follows commands  Safety/Judgement: Good awareness of safety precautions     Strength:   Strength: Generally decreased, functional                       Functional Mobility Training:  Bed Mobility:     Supine to Sit: Supervision              Transfers:  Sit to Stand: Contact guard assistance  Stand to Sit: Contact guard assistance Balance:  Sitting: Intact  Standing: Impaired  Standing - Static: Good  Standing - Dynamic : Fair  Ambulation/Gait Training:  Distance (ft): 75 Feet (ft)  Assistive Device: Walker, rolling;Gait belt  Ambulation - Level of Assistance: Contact guard assistance        Gait Abnormalities: Decreased step clearance  Right Side Weight Bearing: As tolerated     Base of Support: Narrowed  Stance: Right decreased  Speed/Elana: Fluctuations  Step Length: Right shortened;Left shortened     Timed up and go:      Timed Get Up And Go Test: 41.5      Timed Up and Go and G-code impairment scale:  Percentage of Impairment CH     0%    CI     1-19% CJ     20-39% CK     40-59% CL     60-79% CM     80-99% CN      100%   Timed   Score 0-56 10 11-12 13-14 15-16 17-18 19 20          < than 10 seconds=Normal  Greater then 13.5 seconds (in elderly)=Increased fall risk   Misty Sandy Woolacott M. Predicting the probability for falls in community dwelling older adults using the Timed Up and Go Test. Phys Ther. 2000;80:896-903. Pain:  Pain Scale 1: Numeric (0 - 10)  Pain Intensity 1: 0              Activity Tolerance:   No apparent distress   Please refer to the flowsheet for vital signs taken during this treatment.   After treatment:   [X]  Patient left in no apparent distress sitting up in chair  [ ]  Patient left in no apparent distress in bed  [X]  Call bell left within reach  [X]  Nursing notified  [ ]  Caregiver present  [ ]  Bed alarm activated      COMMUNICATION/COLLABORATION:   The patients plan of care was discussed with: Registered Nurse     Nick Feliciano, PT   Time Calculation: 20 mins
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
## (undated) DEVICE — SOLUTION IRRIG 1000ML H2O STRL BLT

## (undated) DEVICE — STERILE POLYISOPRENE POWDER-FREE SURGICAL GLOVES: Brand: PROTEXIS

## (undated) DEVICE — DRAPE SURG W41XL74IN CLR FULL SZ C ARM 3 ADH POLY STRP E

## (undated) DEVICE — (D)PREP SKN CHLRAPRP APPL 26ML -- CONVERT TO ITEM 371833

## (undated) DEVICE — DRSG AQUACEL SURG 3.5 X 10IN -- CONVERT TO ITEM 370183

## (undated) DEVICE — BLADE SURG SAW SAG S STL AGG 90MM LEN 80MM CUT DEPTH 25.4MM

## (undated) DEVICE — KENDALL SCD EXPRESS SLEEVES, KNEE LENGTH, MEDIUM: Brand: KENDALL SCD

## (undated) DEVICE — Device

## (undated) DEVICE — SUTURE VCRL SZ 2 L54IN ABSRB UD L65MM TP-1 1/2 CIR J880T

## (undated) DEVICE — 4-PORT MANIFOLD: Brand: NEPTUNE 2

## (undated) DEVICE — PAD 05IN BASE 3IN PEAK M DENS CONVOLUTED FOAM

## (undated) DEVICE — SUTURE STRATAFIX SYMMETRIC PDS + SZ 1 L18IN ABSRB VLT L48MM SXPP1A400

## (undated) DEVICE — DERMABOND SKIN ADH 0.7ML -- DERMABOND ADVANCED 12/BX

## (undated) DEVICE — TRAY CATH 16F URIN MTR LTX -- CONVERT TO ITEM 363111

## (undated) DEVICE — SOLUTION IV 1000ML 0.9% SOD CHL

## (undated) DEVICE — SUTURE MCRYL SZ 3-0 L27IN ABSRB UD L19MM PS-2 3/8 CIR PRIM Y427H

## (undated) DEVICE — COVER,MAYO STAND,STERILE: Brand: MEDLINE

## (undated) DEVICE — SUTURE VCRL 2-0 L27IN ABSRB CT BRAID COAT UD J275H

## (undated) DEVICE — DEVON™ KNEE AND BODY STRAP 60" X 3" (1.5 M X 7.6 CM): Brand: DEVON

## (undated) DEVICE — 6619 2 PTNT ISO SYS INCISE AREA&LT;(&GT;&&LT;)&GT;P: Brand: STERI-DRAPE™ IOBAN™ 2

## (undated) DEVICE — REM POLYHESIVE ADULT PATIENT RETURN ELECTRODE: Brand: VALLEYLAB